# Patient Record
Sex: FEMALE | Race: BLACK OR AFRICAN AMERICAN | NOT HISPANIC OR LATINO | Employment: STUDENT | RURAL
[De-identification: names, ages, dates, MRNs, and addresses within clinical notes are randomized per-mention and may not be internally consistent; named-entity substitution may affect disease eponyms.]

---
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
## 2020-01-19 DIAGNOSIS — Z30.45 CONTRACEPTIVE PATCH STATUS: ICD-10-CM

## 2020-01-28 ENCOUNTER — TELEPHONE (OUTPATIENT)
Dept: FAMILY MEDICINE CLINIC | Facility: CLINIC | Age: 20
End: 2020-01-28

## 2020-01-28 NOTE — TELEPHONE ENCOUNTER
Pt stated that [facility] not [facility] until March, Pt would like us to call [facility] [phone] for clarifiation    Pt would like a call with options [phone]

## 2020-02-03 DIAGNOSIS — R60.0 LOWER EXTREMITY EDEMA: ICD-10-CM

## 2020-02-03 RX ORDER — FUROSEMIDE 40 MG/1
40 TABLET ORAL DAILY
Qty: 30 TABLET | Refills: 2 | Status: SHIPPED | OUTPATIENT
Start: 2020-02-03

## 2020-02-03 RX ORDER — POTASSIUM CHLORIDE 20 MEQ/1
20 TABLET, EXTENDED RELEASE ORAL DAILY
Qty: 30 TABLET | Refills: 2 | Status: SHIPPED | OUTPATIENT
Start: 2020-02-03

## 2020-04-26 DIAGNOSIS — Z30.45 CONTRACEPTIVE PATCH STATUS: ICD-10-CM

## 2020-04-27 DIAGNOSIS — Z30.45 CONTRACEPTIVE PATCH STATUS: ICD-10-CM

## 2020-06-12 ENCOUNTER — OFFICE VISIT (OUTPATIENT)
Dept: FAMILY MEDICINE CLINIC | Facility: CLINIC | Age: 20
End: 2020-06-12

## 2020-06-12 VITALS
TEMPERATURE: 98.5 F | HEIGHT: 65 IN | SYSTOLIC BLOOD PRESSURE: 120 MMHG | RESPIRATION RATE: 20 BRPM | WEIGHT: 293 LBS | OXYGEN SATURATION: 97 % | BODY MASS INDEX: 48.82 KG/M2 | DIASTOLIC BLOOD PRESSURE: 80 MMHG | HEART RATE: 102 BPM

## 2020-06-12 DIAGNOSIS — Z13.1 SCREENING FOR DIABETES MELLITUS: ICD-10-CM

## 2020-06-12 DIAGNOSIS — Z13.29 SCREENING FOR THYROID DISORDER: ICD-10-CM

## 2020-06-12 DIAGNOSIS — Z11.59 ENCOUNTER FOR HEPATITIS C SCREENING TEST FOR LOW RISK PATIENT: ICD-10-CM

## 2020-06-12 DIAGNOSIS — Z00.00 WELL ADULT EXAM: Primary | ICD-10-CM

## 2020-06-12 DIAGNOSIS — Z13.220 SCREENING FOR LIPOID DISORDERS: ICD-10-CM

## 2020-06-12 PROCEDURE — 99395 PREV VISIT EST AGE 18-39: CPT | Performed by: NURSE PRACTITIONER

## 2020-06-26 ENCOUNTER — LAB (OUTPATIENT)
Dept: LAB | Facility: HOSPITAL | Age: 20
End: 2020-06-26

## 2020-06-26 ENCOUNTER — OFFICE VISIT (OUTPATIENT)
Dept: FAMILY MEDICINE CLINIC | Facility: CLINIC | Age: 20
End: 2020-06-26

## 2020-06-26 VITALS
WEIGHT: 293 LBS | SYSTOLIC BLOOD PRESSURE: 120 MMHG | HEART RATE: 83 BPM | DIASTOLIC BLOOD PRESSURE: 80 MMHG | TEMPERATURE: 98.3 F | BODY MASS INDEX: 48.82 KG/M2 | OXYGEN SATURATION: 97 % | RESPIRATION RATE: 20 BRPM | HEIGHT: 65 IN

## 2020-06-26 DIAGNOSIS — J30.2 CHRONIC SEASONAL ALLERGIC RHINITIS: ICD-10-CM

## 2020-06-26 DIAGNOSIS — F41.9 ANXIETY: Primary | ICD-10-CM

## 2020-06-26 DIAGNOSIS — E66.01 MORBID OBESITY WITH BMI OF 50.0-59.9, ADULT (HCC): ICD-10-CM

## 2020-06-26 LAB
25(OH)D3 SERPL-MCNC: 24.6 NG/ML (ref 30–100)
ALBUMIN SERPL-MCNC: 4.1 G/DL (ref 3.5–5.2)
ALBUMIN/GLOB SERPL: 1.3 G/DL
ALP SERPL-CCNC: 56 U/L (ref 39–117)
ALT SERPL W P-5'-P-CCNC: 8 U/L (ref 1–33)
ANION GAP SERPL CALCULATED.3IONS-SCNC: 10 MMOL/L (ref 5–15)
AST SERPL-CCNC: 9 U/L (ref 1–32)
BASOPHILS # BLD AUTO: 0.01 10*3/MM3 (ref 0–0.2)
BASOPHILS NFR BLD AUTO: 0.1 % (ref 0–1.5)
BILIRUB SERPL-MCNC: 0.3 MG/DL (ref 0.2–1.2)
BUN BLD-MCNC: 11 MG/DL (ref 6–20)
BUN/CREAT SERPL: 16.2 (ref 7–25)
CALCIUM SPEC-SCNC: 9.6 MG/DL (ref 8.6–10.5)
CHLORIDE SERPL-SCNC: 101 MMOL/L (ref 98–107)
CHOLEST SERPL-MCNC: 181 MG/DL (ref 0–200)
CO2 SERPL-SCNC: 25 MMOL/L (ref 22–29)
CREAT BLD-MCNC: 0.68 MG/DL (ref 0.57–1)
DEPRECATED RDW RBC AUTO: 36.9 FL (ref 37–54)
EOSINOPHIL # BLD AUTO: 0.08 10*3/MM3 (ref 0–0.4)
EOSINOPHIL NFR BLD AUTO: 1.1 % (ref 0.3–6.2)
ERYTHROCYTE [DISTWIDTH] IN BLOOD BY AUTOMATED COUNT: 12.3 % (ref 12.3–15.4)
GFR SERPL CREATININE-BSD FRML MDRD: 135 ML/MIN/1.73
GLOBULIN UR ELPH-MCNC: 3.1 GM/DL
GLUCOSE BLD-MCNC: 87 MG/DL (ref 65–99)
HBA1C MFR BLD: 5.2 % (ref 4.8–5.6)
HCT VFR BLD AUTO: 35.3 % (ref 34–46.6)
HCV AB SER DONR QL: NORMAL
HDLC SERPL-MCNC: 69 MG/DL (ref 40–60)
HGB BLD-MCNC: 11.7 G/DL (ref 12–15.9)
IMM GRANULOCYTES # BLD AUTO: 0.02 10*3/MM3 (ref 0–0.05)
IMM GRANULOCYTES NFR BLD AUTO: 0.3 % (ref 0–0.5)
LDLC SERPL CALC-MCNC: 90 MG/DL (ref 0–100)
LDLC/HDLC SERPL: 1.31 {RATIO}
LYMPHOCYTES # BLD AUTO: 2.07 10*3/MM3 (ref 0.7–3.1)
LYMPHOCYTES NFR BLD AUTO: 29.6 % (ref 19.6–45.3)
MCH RBC QN AUTO: 27.3 PG (ref 26.6–33)
MCHC RBC AUTO-ENTMCNC: 33.1 G/DL (ref 31.5–35.7)
MCV RBC AUTO: 82.5 FL (ref 79–97)
MONOCYTES # BLD AUTO: 0.39 10*3/MM3 (ref 0.1–0.9)
MONOCYTES NFR BLD AUTO: 5.6 % (ref 5–12)
NEUTROPHILS # BLD AUTO: 4.43 10*3/MM3 (ref 1.7–7)
NEUTROPHILS NFR BLD AUTO: 63.3 % (ref 42.7–76)
NRBC BLD AUTO-RTO: 0.1 /100 WBC (ref 0–0.2)
PLATELET # BLD AUTO: 295 10*3/MM3 (ref 140–450)
PMV BLD AUTO: 11.5 FL (ref 6–12)
POTASSIUM BLD-SCNC: 4.1 MMOL/L (ref 3.5–5.2)
PROT SERPL-MCNC: 7.2 G/DL (ref 6–8.5)
RBC # BLD AUTO: 4.28 10*6/MM3 (ref 3.77–5.28)
SODIUM BLD-SCNC: 136 MMOL/L (ref 136–145)
TRIGL SERPL-MCNC: 108 MG/DL (ref 0–150)
TSH SERPL DL<=0.05 MIU/L-ACNC: 1.9 UIU/ML (ref 0.27–4.2)
VLDLC SERPL-MCNC: 21.6 MG/DL (ref 5–40)
WBC NRBC COR # BLD: 7 10*3/MM3 (ref 3.4–10.8)

## 2020-06-26 PROCEDURE — 86803 HEPATITIS C AB TEST: CPT | Performed by: NURSE PRACTITIONER

## 2020-06-26 PROCEDURE — 82306 VITAMIN D 25 HYDROXY: CPT | Performed by: NURSE PRACTITIONER

## 2020-06-26 PROCEDURE — 80061 LIPID PANEL: CPT | Performed by: NURSE PRACTITIONER

## 2020-06-26 PROCEDURE — 83036 HEMOGLOBIN GLYCOSYLATED A1C: CPT | Performed by: NURSE PRACTITIONER

## 2020-06-26 PROCEDURE — 80053 COMPREHEN METABOLIC PANEL: CPT | Performed by: NURSE PRACTITIONER

## 2020-06-26 PROCEDURE — 99214 OFFICE O/P EST MOD 30 MIN: CPT | Performed by: NURSE PRACTITIONER

## 2020-06-26 PROCEDURE — 84443 ASSAY THYROID STIM HORMONE: CPT | Performed by: NURSE PRACTITIONER

## 2020-06-26 PROCEDURE — 85025 COMPLETE CBC W/AUTO DIFF WBC: CPT | Performed by: NURSE PRACTITIONER

## 2020-06-26 RX ORDER — BUPROPION HYDROCHLORIDE 150 MG/1
150 TABLET ORAL DAILY
Qty: 90 TABLET | Refills: 1 | Status: SHIPPED | OUTPATIENT
Start: 2020-06-26 | End: 2022-12-06 | Stop reason: SDUPTHER

## 2020-07-05 DIAGNOSIS — Z30.45 CONTRACEPTIVE PATCH STATUS: ICD-10-CM

## 2020-07-07 NOTE — PROGRESS NOTES
Subjective   Jenn Zhong is a 19 y.o. female.     She presents today for her annual wellness visit.  She reports that her lower extremity edema is better controlled with the current Lasix dosing.  Her weight has not remained stable.  She has gained 17 pounds since her last office visit with me.  She reports that her allergy symptoms do seem to be well controlled today in the office.  She is due for routine fasting labs.  She is without any new complaints today in the office.    Leg Swelling   This is a recurrent problem. The current episode started more than 1 month ago. The problem occurs intermittently. The problem has been waxing and waning. Pertinent negatives include no abdominal pain, anorexia, arthralgias, change in bowel habit, chest pain, chills, congestion, coughing, diaphoresis, fatigue, fever, headaches, joint swelling, myalgias, nausea, neck pain, numbness, rash, sore throat, swollen glands, urinary symptoms, vertigo, visual change, vomiting or weakness. The symptoms are aggravated by drinking, eating, standing and walking. She has tried nothing for the symptoms. The treatment provided no relief.   Obesity   This is a chronic problem. The current episode started more than 1 year ago. The problem occurs constantly. The problem has been rapidly worsening. Pertinent negatives include no abdominal pain, anorexia, arthralgias, change in bowel habit, chest pain, chills, congestion, coughing, diaphoresis, fatigue, fever, headaches, joint swelling, myalgias, nausea, neck pain, numbness, rash, sore throat, swollen glands, urinary symptoms, vertigo, visual change, vomiting or weakness. The symptoms are aggravated by drinking and eating. She has tried nothing for the symptoms. The treatment provided no relief.   Lower Extremity Issue   This is a recurrent problem. The current episode started more than 1 year ago. The problem occurs constantly. The problem has been gradually worsening. Pertinent  negatives include no abdominal pain, anorexia, arthralgias, change in bowel habit, chest pain, chills, congestion, coughing, diaphoresis, fatigue, fever, headaches, joint swelling, myalgias, nausea, neck pain, numbness, rash, sore throat, swollen glands, urinary symptoms, vertigo, visual change, vomiting or weakness. The treatment provided no relief.        The following portions of the patient's history were reviewed and updated as appropriate: allergies, current medications, past family history, past medical history, past social history, past surgical history and problem list.    Review of Systems   Constitutional: Negative.  Negative for chills, diaphoresis, fatigue and fever.   HENT: Negative.  Negative for congestion, sore throat and swollen glands.    Eyes: Negative.    Respiratory: Negative.  Negative for cough.    Cardiovascular: Negative.  Negative for chest pain.   Gastrointestinal: Negative.  Negative for abdominal pain, anorexia, change in bowel habit, nausea and vomiting.   Musculoskeletal: Negative.  Negative for arthralgias, joint swelling, myalgias and neck pain.   Skin: Negative.  Negative for rash.   Allergic/Immunologic: Negative.    Neurological: Negative.  Negative for vertigo, weakness and numbness.   Hematological: Negative.    Psychiatric/Behavioral: Negative.        Objective   Physical Exam   Constitutional: She is oriented to person, place, and time. Vital signs are normal. She appears well-developed and well-nourished. No distress. She is morbidly obese.  HENT:   Head: Normocephalic.   Right Ear: External ear normal.   Left Ear: External ear normal.   Nose: Nose normal.   Mouth/Throat: Oropharynx is clear and moist. No oropharyngeal exudate.   Eyes: Pupils are equal, round, and reactive to light. Conjunctivae and EOM are normal. Right eye exhibits no discharge. Left eye exhibits no discharge.   Neck: Normal range of motion. Neck supple. No tracheal deviation present. No thyromegaly  present.   Cardiovascular: Normal rate, regular rhythm and normal heart sounds. Exam reveals no gallop and no friction rub.   No murmur heard.  Pulmonary/Chest: Effort normal and breath sounds normal. No respiratory distress. She has no wheezes. She has no rales. She exhibits no tenderness.   Musculoskeletal: Normal range of motion.   Lymphadenopathy:     She has no cervical adenopathy.   Neurological: She is alert and oriented to person, place, and time.   Skin: Skin is warm and dry. Capillary refill takes less than 2 seconds. No rash noted. She is not diaphoretic. No erythema. No pallor.   Psychiatric: She has a normal mood and affect. Her behavior is normal. Judgment and thought content normal.   Nursing note and vitals reviewed.        Assessment/Plan   Jenn was seen today for annual exam.    Diagnoses and all orders for this visit:    Well adult exam  -     CBC & Differential  -     Comprehensive Metabolic Panel  -     Hemoglobin A1c  -     Lipid Panel  -     TSH  -     Vitamin D 25 Hydroxy  -     Hepatitis C Antibody  -     CBC Auto Differential    Screening for diabetes mellitus  -     CBC & Differential  -     Comprehensive Metabolic Panel  -     Hemoglobin A1c  -     Lipid Panel  -     TSH  -     Vitamin D 25 Hydroxy  -     Hepatitis C Antibody  -     CBC Auto Differential    Screening for lipoid disorders  -     CBC & Differential  -     Comprehensive Metabolic Panel  -     Hemoglobin A1c  -     Lipid Panel  -     TSH  -     Vitamin D 25 Hydroxy  -     Hepatitis C Antibody  -     CBC Auto Differential    Screening for thyroid disorder  -     CBC & Differential  -     Comprehensive Metabolic Panel  -     Hemoglobin A1c  -     Lipid Panel  -     TSH  -     Vitamin D 25 Hydroxy  -     Hepatitis C Antibody  -     CBC Auto Differential    Encounter for hepatitis C screening test for low risk patient  -     CBC & Differential  -     Comprehensive Metabolic Panel  -     Hemoglobin A1c  -     Lipid Panel  -      TSH  -     Vitamin D 25 Hydroxy  -     Hepatitis C Antibody  -     CBC Auto Differential               Patient's Body mass index is 51.82 kg/m². BMI is above normal parameters. Recommendations include: educational material.    Fasting labs.  Continue current medications.  Follow up in 1 year for routine follow up.  Follow up sooner for problems/concerns.  Patient verbalized understanding and agreement with plan of care.        This document has been electronically signed by CINDY Ray on July 6, 2020 21:54

## 2020-07-12 PROBLEM — E66.01 MORBID OBESITY WITH BMI OF 50.0-59.9, ADULT (HCC): Status: ACTIVE | Noted: 2020-07-12

## 2020-07-12 PROBLEM — F41.9 ANXIETY: Status: ACTIVE | Noted: 2020-07-12

## 2020-07-13 NOTE — PROGRESS NOTES
Subjective   Jenn Zhong is a 19 y.o. female.     She presents today for her routine follow-up on chronic medical problems.  She reports that her lower extremity edema is better controlled with the current Lasix dosing.  Her weight has remained stable since her last office visit with me.  She has lost 2 pounds.  She reports that her allergy symptoms do seem to be well controlled today in the office.  She did have her routine fasting labs completed.  She would like to discuss problems with anxiety.  She is interested in something to help with this if possible.  She is otherwise without any other new complaints.    Leg Swelling   This is a recurrent problem. The current episode started more than 1 month ago. The problem occurs intermittently. The problem has been waxing and waning. Pertinent negatives include no abdominal pain, anorexia, arthralgias, change in bowel habit, chest pain, chills, congestion, coughing, diaphoresis, fatigue, fever, headaches, joint swelling, myalgias, nausea, neck pain, numbness, rash, sore throat, swollen glands, urinary symptoms, vertigo, visual change, vomiting or weakness. The symptoms are aggravated by drinking, eating, standing and walking. She has tried nothing for the symptoms. The treatment provided no relief.   Obesity   This is a chronic problem. The current episode started more than 1 year ago. The problem occurs constantly. The problem has been rapidly worsening. Pertinent negatives include no abdominal pain, anorexia, arthralgias, change in bowel habit, chest pain, chills, congestion, coughing, diaphoresis, fatigue, fever, headaches, joint swelling, myalgias, nausea, neck pain, numbness, rash, sore throat, swollen glands, urinary symptoms, vertigo, visual change, vomiting or weakness. The symptoms are aggravated by drinking and eating. She has tried nothing for the symptoms. The treatment provided no relief.   Lower Extremity Issue   This is a recurrent problem.  The current episode started more than 1 year ago. The problem occurs constantly. The problem has been gradually worsening. Pertinent negatives include no abdominal pain, anorexia, arthralgias, change in bowel habit, chest pain, chills, congestion, coughing, diaphoresis, fatigue, fever, headaches, joint swelling, myalgias, nausea, neck pain, numbness, rash, sore throat, swollen glands, urinary symptoms, vertigo, visual change, vomiting or weakness. The treatment provided no relief.   Anxiety   Presents for initial visit. Onset was at an unknown time. The problem has been waxing and waning. Symptoms include decreased concentration, depressed mood, excessive worry, insomnia, nervous/anxious behavior, panic and restlessness. Patient reports no chest pain, compulsions, dizziness, dry mouth, feeling of choking, hyperventilation, impotence, irritability, malaise, muscle tension, nausea, obsessions, palpitations, shortness of breath or suicidal ideas. Symptoms occur constantly. The severity of symptoms is interfering with daily activities. The quality of sleep is fair.     Past treatments include nothing. Compliance with prior treatments has been good.        The following portions of the patient's history were reviewed and updated as appropriate: allergies, current medications, past family history, past medical history, past social history, past surgical history and problem list.    Review of Systems   Constitutional: Negative.  Negative for chills, diaphoresis, fatigue, fever and irritability.   HENT: Negative.  Negative for congestion, sore throat and swollen glands.    Eyes: Negative.    Respiratory: Negative.  Negative for cough and shortness of breath.    Cardiovascular: Negative.  Negative for chest pain and palpitations.   Gastrointestinal: Negative.  Negative for abdominal pain, anorexia, change in bowel habit, nausea and vomiting.   Genitourinary: Negative for impotence.   Musculoskeletal: Negative.  Negative for  arthralgias, joint swelling, myalgias and neck pain.   Skin: Negative.  Negative for rash.   Allergic/Immunologic: Negative.    Neurological: Negative.  Negative for dizziness, vertigo, weakness and numbness.   Hematological: Negative.    Psychiatric/Behavioral: Positive for decreased concentration, dysphoric mood, depressed mood and stress. Negative for suicidal ideas. The patient is nervous/anxious and has insomnia.        Objective   Physical Exam   Constitutional: She is oriented to person, place, and time. Vital signs are normal. She appears well-developed and well-nourished. No distress. She is morbidly obese.  HENT:   Head: Normocephalic.   Right Ear: External ear normal.   Left Ear: External ear normal.   Nose: Nose normal.   Mouth/Throat: Oropharynx is clear and moist. No oropharyngeal exudate.   Eyes: Pupils are equal, round, and reactive to light. Conjunctivae and EOM are normal. Right eye exhibits no discharge. Left eye exhibits no discharge.   Neck: Normal range of motion. Neck supple. No tracheal deviation present. No thyromegaly present.   Cardiovascular: Normal rate, regular rhythm and normal heart sounds. Exam reveals no gallop and no friction rub.   No murmur heard.  Pulmonary/Chest: Effort normal and breath sounds normal. No respiratory distress. She has no wheezes. She has no rales. She exhibits no tenderness.   Musculoskeletal: Normal range of motion.   Lymphadenopathy:     She has no cervical adenopathy.   Neurological: She is alert and oriented to person, place, and time.   Skin: Skin is warm and dry. Capillary refill takes less than 2 seconds. No rash noted. She is not diaphoretic. No erythema. No pallor.   Psychiatric: She has a normal mood and affect. Her behavior is normal. Judgment and thought content normal.   Nursing note and vitals reviewed.        Assessment/Plan   Jenn was seen today for follow-up.    Diagnoses and all orders for this visit:    Anxiety  -     buPROPion XL  (Wellbutrin XL) 150 MG 24 hr tablet; Take 1 tablet by mouth Daily.    Chronic seasonal allergic rhinitis    Morbid obesity with BMI of 50.0-59.9, adult (CMS/[facility])               Patient's Body mass index is 51.57 kg/m². BMI is above normal parameters. Recommendations include: educational material.    Start on Wellbutrin once daily in the morning for anxiety symptoms.  Continue all other current medications.  Follow up in 3 months for routine follow up.  Follow up sooner for problems/concerns.  Patient verbalized understanding and agreement with plan of care.        This document has been electronically signed by CINDY Ray on July 12, 2020 22:27

## 2020-08-11 ENCOUNTER — APPOINTMENT (OUTPATIENT)
Dept: CT IMAGING | Facility: HOSPITAL | Age: 20
End: 2020-08-11

## 2020-08-11 ENCOUNTER — HOSPITAL ENCOUNTER (EMERGENCY)
Facility: HOSPITAL | Age: 20
Discharge: HOME | End: 2020-08-11
Attending: FAMILY MEDICINE | Admitting: FAMILY MEDICINE

## 2020-08-11 VITALS
HEART RATE: 73 BPM | WEIGHT: 293 LBS | DIASTOLIC BLOOD PRESSURE: 67 MMHG | HEIGHT: 67 IN | RESPIRATION RATE: 17 BRPM | BODY MASS INDEX: 45.99 KG/M2 | SYSTOLIC BLOOD PRESSURE: 112 MMHG | OXYGEN SATURATION: 96 % | TEMPERATURE: 97.9 F

## 2020-08-11 DIAGNOSIS — R51.9 NONINTRACTABLE HEADACHE, UNSPECIFIED CHRONICITY PATTERN, UNSPECIFIED HEADACHE TYPE: Primary | ICD-10-CM

## 2020-08-11 LAB
ALBUMIN SERPL-MCNC: 3.9 G/DL (ref 3.5–5.2)
ALBUMIN/GLOB SERPL: 1.2 G/DL
ALP SERPL-CCNC: 66 U/L (ref 39–117)
ALT SERPL W P-5'-P-CCNC: <5 U/L (ref 1–33)
ANION GAP SERPL CALCULATED.3IONS-SCNC: 11 MMOL/L (ref 5–15)
AST SERPL-CCNC: 16 U/L (ref 1–32)
B-HCG UR QL: NEGATIVE
BASOPHILS # BLD AUTO: 0.02 10*3/MM3 (ref 0–0.2)
BASOPHILS NFR BLD AUTO: 0.2 % (ref 0–1.5)
BILIRUB SERPL-MCNC: <0.2 MG/DL (ref 0–1.2)
BUN SERPL-MCNC: 11 MG/DL (ref 6–20)
BUN/CREAT SERPL: 15.3 (ref 7–25)
CALCIUM SPEC-SCNC: 9.2 MG/DL (ref 8.6–10.5)
CHLORIDE SERPL-SCNC: 105 MMOL/L (ref 98–107)
CO2 SERPL-SCNC: 27 MMOL/L (ref 22–29)
CREAT SERPL-MCNC: 0.72 MG/DL (ref 0.57–1)
DEPRECATED RDW RBC AUTO: 37.2 FL (ref 37–54)
EOSINOPHIL # BLD AUTO: 0.09 10*3/MM3 (ref 0–0.4)
EOSINOPHIL NFR BLD AUTO: 1.1 % (ref 0.3–6.2)
ERYTHROCYTE [DISTWIDTH] IN BLOOD BY AUTOMATED COUNT: 12.2 % (ref 12.3–15.4)
GFR SERPL CREATININE-BSD FRML MDRD: 126 ML/MIN/1.73
GLOBULIN UR ELPH-MCNC: 3.2 GM/DL
GLUCOSE SERPL-MCNC: 105 MG/DL (ref 65–99)
HCT VFR BLD AUTO: 37 % (ref 34–46.6)
HGB BLD-MCNC: 11.9 G/DL (ref 12–15.9)
IMM GRANULOCYTES # BLD AUTO: 0.03 10*3/MM3 (ref 0–0.05)
IMM GRANULOCYTES NFR BLD AUTO: 0.4 % (ref 0–0.5)
LYMPHOCYTES # BLD AUTO: 1.84 10*3/MM3 (ref 0.7–3.1)
LYMPHOCYTES NFR BLD AUTO: 22 % (ref 19.6–45.3)
MCH RBC QN AUTO: 27 PG (ref 26.6–33)
MCHC RBC AUTO-ENTMCNC: 32.2 G/DL (ref 31.5–35.7)
MCV RBC AUTO: 83.9 FL (ref 79–97)
MONOCYTES # BLD AUTO: 0.39 10*3/MM3 (ref 0.1–0.9)
MONOCYTES NFR BLD AUTO: 4.7 % (ref 5–12)
NEUTROPHILS NFR BLD AUTO: 5.98 10*3/MM3 (ref 1.7–7)
NEUTROPHILS NFR BLD AUTO: 71.6 % (ref 42.7–76)
NRBC BLD AUTO-RTO: 0 /100 WBC (ref 0–0.2)
PLATELET # BLD AUTO: 304 10*3/MM3 (ref 140–450)
PMV BLD AUTO: 10.6 FL (ref 6–12)
POTASSIUM SERPL-SCNC: 4 MMOL/L (ref 3.5–5.2)
PROT SERPL-MCNC: 7.1 G/DL (ref 6–8.5)
RBC # BLD AUTO: 4.41 10*6/MM3 (ref 3.77–5.28)
SODIUM SERPL-SCNC: 143 MMOL/L (ref 136–145)
WBC # BLD AUTO: 8.35 10*3/MM3 (ref 3.4–10.8)

## 2020-08-11 PROCEDURE — 96374 THER/PROPH/DIAG INJ IV PUSH: CPT

## 2020-08-11 PROCEDURE — 25010000002 KETOROLAC TROMETHAMINE PER 15 MG: Performed by: PHYSICIAN ASSISTANT

## 2020-08-11 PROCEDURE — 70450 CT HEAD/BRAIN W/O DYE: CPT

## 2020-08-11 PROCEDURE — 99283 EMERGENCY DEPT VISIT LOW MDM: CPT

## 2020-08-11 PROCEDURE — 80053 COMPREHEN METABOLIC PANEL: CPT | Performed by: PHYSICIAN ASSISTANT

## 2020-08-11 PROCEDURE — 81025 URINE PREGNANCY TEST: CPT | Performed by: PHYSICIAN ASSISTANT

## 2020-08-11 PROCEDURE — 25010000002 METOCLOPRAMIDE PER 10 MG: Performed by: PHYSICIAN ASSISTANT

## 2020-08-11 PROCEDURE — 85025 COMPLETE CBC W/AUTO DIFF WBC: CPT | Performed by: PHYSICIAN ASSISTANT

## 2020-08-11 PROCEDURE — 96375 TX/PRO/DX INJ NEW DRUG ADDON: CPT

## 2020-08-11 RX ORDER — SODIUM CHLORIDE 0.9 % (FLUSH) 0.9 %
10 SYRINGE (ML) INJECTION AS NEEDED
Status: DISCONTINUED | OUTPATIENT
Start: 2020-08-11 | End: 2020-08-11 | Stop reason: HOSPADM

## 2020-08-11 RX ORDER — METOCLOPRAMIDE HYDROCHLORIDE 5 MG/ML
10 INJECTION INTRAMUSCULAR; INTRAVENOUS ONCE
Status: COMPLETED | OUTPATIENT
Start: 2020-08-11 | End: 2020-08-11

## 2020-08-11 RX ORDER — DIPHENHYDRAMINE HYDROCHLORIDE 50 MG/ML
25 INJECTION INTRAMUSCULAR; INTRAVENOUS ONCE
Status: DISCONTINUED | OUTPATIENT
Start: 2020-08-11 | End: 2020-08-11 | Stop reason: HOSPADM

## 2020-08-11 RX ORDER — KETOROLAC TROMETHAMINE 15 MG/ML
15 INJECTION, SOLUTION INTRAMUSCULAR; INTRAVENOUS ONCE
Status: COMPLETED | OUTPATIENT
Start: 2020-08-11 | End: 2020-08-11

## 2020-08-11 RX ADMIN — METOCLOPRAMIDE 10 MG: 5 INJECTION, SOLUTION INTRAMUSCULAR; INTRAVENOUS at 14:39

## 2020-08-11 RX ADMIN — SODIUM CHLORIDE 1000 ML: 900 INJECTION, SOLUTION INTRAVENOUS at 11:46

## 2020-08-11 RX ADMIN — KETOROLAC TROMETHAMINE 15 MG: 15 INJECTION, SOLUTION INTRAMUSCULAR; INTRAVENOUS at 14:39

## 2020-08-11 NOTE — ED PROVIDER NOTES
Subjective   Patient presents to emergency department for headache x 2 weeks.  Describes it as burning sensation/pressure on the right side of her head.  Patient states she would like to have a CT scan due to her father being diagnosed with brain cancer 2 years ago.  Denies syncope, visual change, focal weakness, numbness/tingling.        History provided by:  Patient   used: No    Headache   Pain location:  R parietal  Quality:  Dull  Duration:  2 weeks  Timing:  Intermittent  Chronicity:  New  Associated symptoms: no abdominal pain, no back pain, no cough, no dizziness, no fever, no nausea, no numbness, no sore throat, no vomiting and no weakness        Review of Systems   Constitutional: Negative for chills and fever.   HENT: Negative for sore throat and trouble swallowing.    Eyes: Negative for visual disturbance.   Respiratory: Negative for cough and shortness of breath.    Cardiovascular: Negative for chest pain.   Gastrointestinal: Negative for abdominal pain, nausea and vomiting.   Genitourinary: Negative for dysuria and flank pain.   Musculoskeletal: Negative for back pain.   Skin: Negative for color change.   Allergic/Immunologic: Negative for immunocompromised state.   Neurological: Positive for headaches. Negative for dizziness, syncope, weakness and numbness.   Hematological: Does not bruise/bleed easily.   Psychiatric/Behavioral: Negative for confusion.       Past Medical History:   Diagnosis Date   • Asthma    • Bunion    • Exercise-induced asthma        Allergies   Allergen Reactions   • Penicillins Hives   • Tuberculin Tests Other (See Comments)     Redness at the site of injection       Past Surgical History:   Procedure Laterality Date   • BRANCHIAL CLEFT CYST EXCISION     • CHEST SURGERY      bronchial cyst removal       Family History   Problem Relation Age of Onset   • Gout Father    • Sleep apnea Father    • Cancer Father    • Diabetes Maternal Aunt    • Hypertension  "Maternal Grandmother    • Diabetes Maternal Grandmother    • Cervical cancer Maternal Grandmother    • Throat cancer Maternal Grandfather    • Leukemia Maternal Grandfather    • Cancer Maternal Grandfather        Social History     Socioeconomic History   • Marital status: Single     Spouse name: Not on file   • Number of children: Not on file   • Years of education: Not on file   • Highest education level: Not on file   Tobacco Use   • Smoking status: Never Smoker   • Smokeless tobacco: Never Used   Substance and Sexual Activity   • Alcohol use: No   • Drug use: No   • Sexual activity: Defer     Birth control/protection: Patch           Objective      /67 (BP Location: Left arm, Patient Position: Lying)   Pulse 73   Temp 97.9 °F (36.6 °C) (Infrared)   Resp 17   Ht 170.2 cm (67\")   Wt (!) 139 kg (306 lb)   LMP 07/29/2020 (Within Days)   SpO2 96%   Breastfeeding No   BMI 47.93 kg/m²     Physical Exam   Constitutional: She is oriented to person, place, and time. She appears well-developed and well-nourished.   HENT:   Head: Normocephalic and atraumatic.   Eyes: Pupils are equal, round, and reactive to light. Conjunctivae and EOM are normal.   Cardiovascular: Normal rate, regular rhythm, normal heart sounds and intact distal pulses.   Pulmonary/Chest: Effort normal and breath sounds normal. No respiratory distress. She has no wheezes.   Musculoskeletal: She exhibits no edema.   Neurological: She is alert and oriented to person, place, and time.   Skin: Skin is warm and dry. Capillary refill takes less than 2 seconds.   Psychiatric: She has a normal mood and affect. Her behavior is normal. Thought content normal.   Nursing note and vitals reviewed.      Procedures           ED Course      Results for orders placed or performed during the hospital encounter of 08/11/20   Comprehensive Metabolic Panel   Result Value Ref Range    Glucose 105 (H) 65 - 99 mg/dL    BUN 11 6 - 20 mg/dL    Creatinine 0.72 0.57 - " 1.00 mg/dL    Sodium 143 136 - 145 mmol/L    Potassium 4.0 3.5 - 5.2 mmol/L    Chloride 105 98 - 107 mmol/L    CO2 27.0 22.0 - 29.0 mmol/L    Calcium 9.2 8.6 - 10.5 mg/dL    Total Protein 7.1 6.0 - 8.5 g/dL    Albumin 3.90 3.50 - 5.20 g/dL    ALT (SGPT) <5 1 - 33 U/L    AST (SGOT) 16 1 - 32 U/L    Alkaline Phosphatase 66 39 - 117 U/L    Total Bilirubin <0.2 0.0 - 1.2 mg/dL    eGFR  African Amer 126 >60 mL/min/1.73    Globulin 3.2 gm/dL    A/G Ratio 1.2 g/dL    BUN/Creatinine Ratio 15.3 7.0 - 25.0    Anion Gap 11.0 5.0 - 15.0 mmol/L   CBC Auto Differential   Result Value Ref Range    WBC 8.35 3.40 - 10.80 10*3/mm3    RBC 4.41 3.77 - 5.28 10*6/mm3    Hemoglobin 11.9 (L) 12.0 - 15.9 g/dL    Hematocrit 37.0 34.0 - 46.6 %    MCV 83.9 79.0 - 97.0 fL    MCH 27.0 26.6 - 33.0 pg    MCHC 32.2 31.5 - 35.7 g/dL    RDW 12.2 (L) 12.3 - 15.4 %    RDW-SD 37.2 37.0 - 54.0 fl    MPV 10.6 6.0 - 12.0 fL    Platelets 304 140 - 450 10*3/mm3    Neutrophil % 71.6 42.7 - 76.0 %    Lymphocyte % 22.0 19.6 - 45.3 %    Monocyte % 4.7 (L) 5.0 - 12.0 %    Eosinophil % 1.1 0.3 - 6.2 %    Basophil % 0.2 0.0 - 1.5 %    Immature Grans % 0.4 0.0 - 0.5 %    Neutrophils, Absolute 5.98 1.70 - 7.00 10*3/mm3    Lymphocytes, Absolute 1.84 0.70 - 3.10 10*3/mm3    Monocytes, Absolute 0.39 0.10 - 0.90 10*3/mm3    Eosinophils, Absolute 0.09 0.00 - 0.40 10*3/mm3    Basophils, Absolute 0.02 0.00 - 0.20 10*3/mm3    Immature Grans, Absolute 0.03 0.00 - 0.05 10*3/mm3    nRBC 0.0 0.0 - 0.2 /100 WBC   Pregnancy, Urine - Urine, Clean Catch   Result Value Ref Range    HCG, Urine QL Negative Negative   '  Ct Head Without Contrast    Result Date: 8/11/2020  Narrative: EXAM: CT HEAD WITHOUT IV CONTRAST COMPARISONS: None INDICATION: Headache TECHNIQUE: Noncontrast CT images were obtained of the brain. Reformats were provided. FINDINGS:  No intracranial hemorrhage, appreciable mass, mass effect or midline shift. There is preservation of the gray-white matter differentiation.  No dense MCA or insular ribbon sign. The ventricles and other CSF containing spaces are within normal limits. Calvarium is intact. Paranasal sinuses are clear. Mastoid air cells are clear. Orbits are unremarkable.     Impression: No acute intracranial process. Electronically signed by:  Vidya Garcia MD  8/11/2020 1:48 PM CDT Workstation: [id number]    Discussed results with patient.  Gave educational materials.  Advised close follow up with PCP.  Return to emergency department for new or worsening symptoms.                                         MDM    Final diagnoses:   Nonintractable headache, unspecified chronicity pattern, unspecified headache type            Rory Apodaca PA-C  08/11/20 9521

## 2020-08-14 ENCOUNTER — PATIENT MESSAGE (OUTPATIENT)
Dept: FAMILY MEDICINE CLINIC | Facility: CLINIC | Age: 20
End: 2020-08-14

## 2020-08-14 DIAGNOSIS — IMO0002 CHRONIC MIGRAINE: Primary | ICD-10-CM

## 2020-08-16 RX ORDER — AMITRIPTYLINE HYDROCHLORIDE 10 MG/1
10 TABLET, FILM COATED ORAL NIGHTLY
Qty: 30 TABLET | Refills: 2 | Status: SHIPPED | OUTPATIENT
Start: 2020-08-16

## 2020-08-16 RX ORDER — RIZATRIPTAN BENZOATE 5 MG/1
5-10 TABLET ORAL ONCE AS NEEDED
Qty: 18 TABLET | Refills: 2 | Status: SHIPPED | OUTPATIENT
Start: 2020-08-16

## 2020-08-17 NOTE — TELEPHONE ENCOUNTER
From: Jenn Zhong  To: Aileen [place] CINDY HINES  Sent: 8/14/2020 10:02 AM CDT  Subject: Prescription Question    Hi !     So I've been having this really bad headache in one spot of my head and it hurts soooo bad I can barely touch it! I feel pain that runs behind my eyes and down by the right side of my mouth. I can barely lay on my back because the pillow press against it to badly. I went to the ER on Tuesday in [place]. They took a CT scan and he said it read normal. They gave me Benadryl and Termidal (mireya how to pronounce it or spell it). Next two days it was still hurting. I came back to [place] [facility] just [facility] head, and called it a Tension headache. She prescribed me some Anaprox .. I haven't gotten it yet because I wanted to see what you think I should do. My head is currently still hurting and it's going on week 3!

## 2020-08-21 ENCOUNTER — PATIENT MESSAGE (OUTPATIENT)
Dept: FAMILY MEDICINE CLINIC | Facility: CLINIC | Age: 20
End: 2020-08-21

## 2020-08-21 RX ORDER — FLUCONAZOLE 200 MG/1
200 TABLET ORAL ONCE
Qty: 1 TABLET | Refills: 0 | Status: SHIPPED | OUTPATIENT
Start: 2020-08-21 | End: 2020-08-21

## 2020-08-21 NOTE — TELEPHONE ENCOUNTER
From: Jenn Zhong  To: Aileen [place] APRN  Sent: 8/21/2020 8:07 AM CDT  Subject: Referral Request    Hi !     Which pharmacy did you send the medicine for my head aches? Also, I still have this yeast infection. I tried Monistat, Other generic over the counter things, cranberry juice, and nothing has made it go away! I keep seeing the white cheesy discharge, burning when I pee, and itching. (I have not been sexually active). I leave to come home for the weekend, but I don't leave until 8pm here, could I get something for this sent to the pharmacy today ?

## 2020-08-28 ENCOUNTER — PATIENT MESSAGE (OUTPATIENT)
Dept: FAMILY MEDICINE CLINIC | Facility: CLINIC | Age: 20
End: 2020-08-28

## 2020-08-28 DIAGNOSIS — Z11.1 TUBERCULOSIS SCREENING: Primary | ICD-10-CM

## 2020-08-28 NOTE — TELEPHONE ENCOUNTER
From: Jenn Zhong  To: Aileen [place] APRN  Sent: 8/28/2020 9:32 AM CDT  Subject: Referral Request    Rakesh Gallagher,     Can you send over a chest X-ray order ?   I've called all of the [facility]es in the city it seems like and one lady said if you could send the order I can just go to the hospital and get it done ? I'm so lost!

## 2020-09-01 ENCOUNTER — HOSPITAL ENCOUNTER (OUTPATIENT)
Dept: GENERAL RADIOLOGY | Facility: HOSPITAL | Age: 20
End: 2020-09-01

## 2020-09-01 ENCOUNTER — HOSPITAL ENCOUNTER (OUTPATIENT)
Dept: GENERAL RADIOLOGY | Facility: HOSPITAL | Age: 20
Discharge: HOME | End: 2020-09-01

## 2020-09-01 ENCOUNTER — HOSPITAL ENCOUNTER (OUTPATIENT)
Dept: GENERAL RADIOLOGY | Facility: HOSPITAL | Age: 20
Discharge: HOME | End: 2020-09-01
Admitting: NURSE PRACTITIONER

## 2020-09-01 DIAGNOSIS — Z11.1 SCREENING EXAMINATION FOR PULMONARY TUBERCULOSIS: ICD-10-CM

## 2020-09-01 PROCEDURE — 71046 X-RAY EXAM CHEST 2 VIEWS: CPT

## 2020-09-02 ENCOUNTER — TELEPHONE (OUTPATIENT)
Dept: FAMILY MEDICINE CLINIC | Facility: CLINIC | Age: 20
End: 2020-09-02

## 2020-10-27 DIAGNOSIS — Z30.45 CONTRACEPTIVE PATCH STATUS: ICD-10-CM

## 2020-10-28 RX ORDER — NORELGESTROMIN AND ETHINYL ESTRADIOL 150; 35 UG/D; UG/D
1 PATCH TRANSDERMAL WEEKLY
Qty: 9 PATCH | Refills: 1 | Status: SHIPPED | OUTPATIENT
Start: 2020-10-28 | End: 2021-01-10 | Stop reason: SDUPTHER

## 2020-11-02 ENCOUNTER — TELEPHONE (OUTPATIENT)
Dept: FAMILY MEDICINE CLINIC | Facility: CLINIC | Age: 20
End: 2020-11-02

## 2020-11-02 NOTE — TELEPHONE ENCOUNTER
PATIENT CALLED TO FOLLOW UP ON MESSAGES LEFT ON MYCHART, STATES SHE HAS A YEAST INFECTION    PT WOULD LIKE TO KNOW IF ANYTHING CAN BE CALLED IN TO            [facility] [address] [place] [address] [id number] - [phone]  - [phone]   [phone]     CALLBACK [phone]

## 2020-11-03 RX ORDER — FLUCONAZOLE 150 MG/1
150 TABLET ORAL ONCE
Qty: 1 TABLET | Refills: 1 | Status: SHIPPED | OUTPATIENT
Start: 2020-11-03 | End: 2020-11-03 | Stop reason: SDUPTHER

## 2021-01-07 ENCOUNTER — PATIENT MESSAGE (OUTPATIENT)
Dept: FAMILY MEDICINE CLINIC | Facility: CLINIC | Age: 21
End: 2021-01-07

## 2021-01-07 RX ORDER — FLUCONAZOLE 150 MG/1
150 TABLET ORAL DAILY
Qty: 3 TABLET | Refills: 1 | Status: SHIPPED | OUTPATIENT
Start: 2021-01-07 | End: 2021-01-10 | Stop reason: SDUPTHER

## 2021-01-07 NOTE — TELEPHONE ENCOUNTER
From: Jenn Zhong  To: CINDY Ray  Sent: 1/7/2021 4:09 PM CST  Subject: Prescription Question    Hi !     Can I get a refill of the diflucan sent to the pharmacy in [place]?     Thank you!

## 2021-01-10 DIAGNOSIS — Z30.45 CONTRACEPTIVE PATCH STATUS: ICD-10-CM

## 2021-01-12 RX ORDER — FLUCONAZOLE 150 MG/1
150 TABLET ORAL DAILY
Qty: 3 TABLET | Refills: 1 | Status: SHIPPED | OUTPATIENT
Start: 2021-01-12 | End: 2021-06-25 | Stop reason: SDUPTHER

## 2021-01-12 RX ORDER — NORELGESTROMIN AND ETHINYL ESTRADIOL 150; 35 UG/D; UG/D
1 PATCH TRANSDERMAL WEEKLY
Qty: 9 PATCH | Refills: 1 | Status: SHIPPED | OUTPATIENT
Start: 2021-01-12 | End: 2021-04-18 | Stop reason: SDUPTHER

## 2021-03-24 RX ORDER — ALBUTEROL SULFATE 90 UG/1
2 AEROSOL, METERED RESPIRATORY (INHALATION) EVERY 6 HOURS PRN
Qty: 18 G | Refills: 2 | Status: SHIPPED | OUTPATIENT
Start: 2021-03-24 | End: 2021-10-29 | Stop reason: SDUPTHER

## 2021-03-26 ENCOUNTER — TELEPHONE (OUTPATIENT)
Dept: FAMILY MEDICINE CLINIC | Facility: CLINIC | Age: 21
End: 2021-03-26

## 2021-03-26 NOTE — TELEPHONE ENCOUNTER
[facility] in [place] called in regards to a prescription that was sent over for Ms. Zhong.Please call. It has two different directions and need to clarify

## 2021-03-26 NOTE — TELEPHONE ENCOUNTER
Returned call to pharmacy to verify which medication they were calling about. Rx for inhaler, albuterol, had 2 different directions on it. Verified with CINDY Epps albuterol was to be used every 4-6 hours as needed. Spoke with [facility] pharmacy to let them know.

## 2021-04-18 DIAGNOSIS — Z30.45 CONTRACEPTIVE PATCH STATUS: ICD-10-CM

## 2021-04-20 RX ORDER — NORELGESTROMIN AND ETHINYL ESTRADIOL 150; 35 UG/D; UG/D
1 PATCH TRANSDERMAL WEEKLY
Qty: 9 PATCH | Refills: 1 | Status: SHIPPED | OUTPATIENT
Start: 2021-04-20 | End: 2021-08-03 | Stop reason: SDUPTHER

## 2021-05-30 ENCOUNTER — PATIENT MESSAGE (OUTPATIENT)
Dept: FAMILY MEDICINE CLINIC | Facility: CLINIC | Age: 21
End: 2021-05-30

## 2021-05-30 DIAGNOSIS — J02.9 SORE THROAT: Primary | ICD-10-CM

## 2021-06-01 RX ORDER — CEFDINIR 300 MG/1
300 CAPSULE ORAL 2 TIMES DAILY
Qty: 20 CAPSULE | Refills: 0 | Status: SHIPPED | OUTPATIENT
Start: 2021-06-01 | End: 2021-06-11

## 2021-06-01 NOTE — TELEPHONE ENCOUNTER
From: Jenn Zhong  To: Aileen GoddardCINDY ledesma  Sent: 2021 1:52 PM CDT  Subject: Referral Request    Hi !     So I believe I have tonsillitis. I went and visited [facility] yesterday and got a strep and Covid test and they both were negative. However since then my right tonsil has developed white spots. And it's only my right tonsil. I had a fever lastnight, shivering, and everything. I was suppose to travel to [place] for my dads  on Tuesday but I was hoping you could send me something to take to [facility] in [place].

## 2021-06-25 RX ORDER — FLUCONAZOLE 150 MG/1
150 TABLET ORAL DAILY
Qty: 3 TABLET | Refills: 1 | Status: SHIPPED | OUTPATIENT
Start: 2021-06-25 | End: 2021-07-13 | Stop reason: SDUPTHER

## 2021-07-13 ENCOUNTER — OFFICE VISIT (OUTPATIENT)
Dept: FAMILY MEDICINE CLINIC | Facility: CLINIC | Age: 21
End: 2021-07-13

## 2021-07-13 VITALS
HEIGHT: 67 IN | HEART RATE: 88 BPM | WEIGHT: 289.3 LBS | BODY MASS INDEX: 45.4 KG/M2 | OXYGEN SATURATION: 96 % | SYSTOLIC BLOOD PRESSURE: 118 MMHG | DIASTOLIC BLOOD PRESSURE: 70 MMHG | TEMPERATURE: 97.8 F | RESPIRATION RATE: 20 BRPM

## 2021-07-13 DIAGNOSIS — Z13.29 SCREENING FOR THYROID DISORDER: ICD-10-CM

## 2021-07-13 DIAGNOSIS — Z13.1 SCREENING FOR DIABETES MELLITUS: ICD-10-CM

## 2021-07-13 DIAGNOSIS — Z00.00 WELL ADULT HEALTH CHECK: Primary | ICD-10-CM

## 2021-07-13 DIAGNOSIS — B37.31 VAGINAL YEAST INFECTION: ICD-10-CM

## 2021-07-13 DIAGNOSIS — B00.1 COLD SORE: ICD-10-CM

## 2021-07-13 DIAGNOSIS — Z13.220 SCREENING FOR LIPOID DISORDERS: ICD-10-CM

## 2021-07-13 PROCEDURE — 99395 PREV VISIT EST AGE 18-39: CPT | Performed by: NURSE PRACTITIONER

## 2021-07-13 RX ORDER — FLUCONAZOLE 150 MG/1
150 TABLET ORAL DAILY
Qty: 3 TABLET | Refills: 5 | Status: SHIPPED | OUTPATIENT
Start: 2021-07-13 | End: 2021-10-29 | Stop reason: SDUPTHER

## 2021-07-13 RX ORDER — VALACYCLOVIR HYDROCHLORIDE 1 G/1
1000 TABLET, FILM COATED ORAL 3 TIMES DAILY
Qty: 15 TABLET | Refills: 0 | Status: SHIPPED | OUTPATIENT
Start: 2021-07-13 | End: 2021-07-18

## 2021-08-01 NOTE — PROGRESS NOTES
Subjective   Jenn Zhong is a 20 y.o. female.     She presents today for her annual wellness visit.  She reports that she has been doing well since her last office visit with me.  She has lost 17 pounds.  Her BMI is currently 45.3%.  She is due for routine fasting labs.  She does have complaints of a possible yeast infection today in the office.  She also would like a prescription for Valtrex for cold sores.  She is otherwise without any other new complaints today in the office.    Vaginitis  This is a recurrent problem. The current episode started more than 1 month ago. The problem occurs intermittently. The problem has been waxing and waning. Pertinent negatives include no abdominal pain, anorexia, arthralgias, change in bowel habit, chest pain, chills, congestion, coughing, diaphoresis, fatigue, fever, headaches, joint swelling, myalgias, nausea, neck pain, numbness, rash, sore throat, swollen glands, urinary symptoms, vertigo, visual change, vomiting or weakness. The treatment provided no relief.        The following portions of the patient's history were reviewed and updated as appropriate: allergies, current medications, past family history, past medical history, past social history, past surgical history and problem list.    Review of Systems   Constitutional: Negative.  Negative for chills, diaphoresis, fatigue and fever.   HENT: Negative.  Negative for congestion, sore throat and swollen glands.    Eyes: Negative.    Respiratory: Negative.  Negative for cough.    Cardiovascular: Negative.  Negative for chest pain.   Gastrointestinal: Negative.  Negative for abdominal pain, anorexia, change in bowel habit, nausea and vomiting.   Genitourinary: Positive for vaginal discharge.   Musculoskeletal: Negative.  Negative for arthralgias, joint swelling, myalgias and neck pain.   Skin: Negative.  Negative for rash.   Allergic/Immunologic: Negative.    Neurological: Negative.  Negative for vertigo,  weakness and numbness.   Hematological: Negative.    Psychiatric/Behavioral: Negative.        Objective   Physical Exam  Vitals reviewed.   Constitutional:       General: She is not in acute distress.     Appearance: She is well-developed. She is morbidly obese. She is not diaphoretic.   HENT:      Head: Normocephalic.      Right Ear: External ear normal.      Left Ear: External ear normal.      Nose: Nose normal.   Eyes:      Pupils: Pupils are equal, round, and reactive to light.   Neck:      Thyroid: No thyromegaly.      Vascular: No JVD.   Cardiovascular:      Rate and Rhythm: Normal rate and regular rhythm.      Heart sounds: No murmur heard.   No friction rub. No gallop.    Pulmonary:      Effort: Pulmonary effort is normal. No respiratory distress.      Breath sounds: Normal breath sounds. No wheezing or rales.   Musculoskeletal:         General: Normal range of motion.      Cervical back: Normal range of motion and neck supple.   Skin:     General: Skin is warm and dry.      Coloration: Skin is not pale.      Findings: No erythema or rash.   Neurological:      Mental Status: She is alert and oriented to person, place, and time.   Psychiatric:         Behavior: Behavior normal.         Thought Content: Thought content normal.         Judgment: Judgment normal.           Assessment/Plan   Diagnoses and all orders for this visit:    1. Well adult health check (Primary)  -     CBC & Differential  -     Comprehensive Metabolic Panel  -     Hemoglobin A1c  -     Lipid Panel  -     TSH  -     Vitamin D 25 Hydroxy    2. Screening for diabetes mellitus  -     CBC & Differential  -     Comprehensive Metabolic Panel  -     Hemoglobin A1c  -     Lipid Panel  -     TSH  -     Vitamin D 25 Hydroxy    3. Screening for lipoid disorders  -     CBC & Differential  -     Comprehensive Metabolic Panel  -     Hemoglobin A1c  -     Lipid Panel  -     TSH  -     Vitamin D 25 Hydroxy    4. Screening for thyroid disorder  -     CBC  & Differential  -     Comprehensive Metabolic Panel  -     Hemoglobin A1c  -     Lipid Panel  -     TSH  -     Vitamin D 25 Hydroxy    5. Vaginal yeast infection  -     fluconazole (Diflucan) 150 MG tablet; Take 1 tablet by mouth Daily.  Dispense: 3 tablet; Refill: 5    6. Cold sore  -     valACYclovir (Valtrex) 1000 MG tablet; Take 1 tablet by mouth 3 (Three) Times a Day for 5 days.  Dispense: 15 tablet; Refill: 0               Patient's Body mass index is 45.31 kg/m². indicating that she is morbidly obese (BMI > 40 or > 35 with obesity - related health condition). Obesity-related health conditions include the following: none. Obesity is improving with lifestyle modifications. BMI is is above average; BMI management plan is completed. We discussed portion control and increasing exercise..    Anticipatory guidance provided at discharge.  Fasting labs.  Diflucan as needed for yeast symptoms.  Valtrex as needed for cold sores.  Continue all other current medications.  Follow up as scheduled for routine follow up.  Follow up sooner for problems/concerns.  Patient verbalized understanding and agreement with plan of care.        This document has been electronically signed by CINDY Ray on August 1, 2021 18:42 CDT

## 2021-08-03 DIAGNOSIS — Z30.45 CONTRACEPTIVE PATCH STATUS: ICD-10-CM

## 2021-08-03 RX ORDER — NORELGESTROMIN AND ETHINYL ESTRADIOL 150; 35 UG/D; UG/D
1 PATCH TRANSDERMAL WEEKLY
Qty: 9 PATCH | Refills: 1 | Status: SHIPPED | OUTPATIENT
Start: 2021-08-03 | End: 2021-10-11 | Stop reason: SDUPTHER

## 2021-09-10 DIAGNOSIS — B37.31 VAGINAL YEAST INFECTION: ICD-10-CM

## 2021-09-10 RX ORDER — FLUCONAZOLE 150 MG/1
150 TABLET ORAL DAILY
Qty: 3 TABLET | Refills: 5 | OUTPATIENT
Start: 2021-09-10

## 2021-09-10 NOTE — TELEPHONE ENCOUNTER
Rx Refill Note  Requested Prescriptions     Pending Prescriptions Disp Refills   • fluconazole (Diflucan) 150 MG tablet 3 tablet 5     Sig: Take 1 tablet by mouth Daily.      Last office visit with prescribing clinician: 7/13/2021      Next office visit with prescribing clinician: Visit date not found   {TIP  Encounters:      RX sent on 7/13/21 # 3 tabs with 5 refills.    {TIP  Please add Last Relevant Lab Date if appropriate  {TIP  Is Refill Pharmacy correct? yes  Larry Yang LPN  09/10/21, 08:30 CDT

## 2021-10-11 DIAGNOSIS — Z30.45 CONTRACEPTIVE PATCH STATUS: ICD-10-CM

## 2021-10-12 RX ORDER — NORELGESTROMIN AND ETHINYL ESTRADIOL 150; 35 UG/D; UG/D
1 PATCH TRANSDERMAL WEEKLY
Qty: 9 PATCH | Refills: 3 | Status: SHIPPED | OUTPATIENT
Start: 2021-10-12 | End: 2021-10-29 | Stop reason: SDUPTHER

## 2021-10-28 ENCOUNTER — TELEPHONE (OUTPATIENT)
Dept: FAMILY MEDICINE CLINIC | Facility: CLINIC | Age: 21
End: 2021-10-28

## 2021-10-28 NOTE — TELEPHONE ENCOUNTER
Tried calling to let patient know that due to a family emergency her provider will not be in the office. I moved appointment to Same Day schedule at the same time.  No answer left detailed voice message

## 2021-10-29 ENCOUNTER — OFFICE VISIT (OUTPATIENT)
Dept: FAMILY MEDICINE CLINIC | Facility: CLINIC | Age: 21
End: 2021-10-29

## 2021-10-29 ENCOUNTER — TELEPHONE (OUTPATIENT)
Dept: FAMILY MEDICINE CLINIC | Facility: CLINIC | Age: 21
End: 2021-10-29

## 2021-10-29 VITALS
HEIGHT: 67 IN | SYSTOLIC BLOOD PRESSURE: 118 MMHG | BODY MASS INDEX: 45.99 KG/M2 | HEART RATE: 84 BPM | TEMPERATURE: 97.1 F | DIASTOLIC BLOOD PRESSURE: 70 MMHG | OXYGEN SATURATION: 96 % | RESPIRATION RATE: 20 BRPM | WEIGHT: 293 LBS

## 2021-10-29 DIAGNOSIS — Z30.45 CONTRACEPTIVE PATCH STATUS: ICD-10-CM

## 2021-10-29 DIAGNOSIS — J35.01 CHRONIC TONSILLITIS: Primary | ICD-10-CM

## 2021-10-29 DIAGNOSIS — B37.31 VAGINAL YEAST INFECTION: ICD-10-CM

## 2021-10-29 PROCEDURE — 99214 OFFICE O/P EST MOD 30 MIN: CPT | Performed by: NURSE PRACTITIONER

## 2021-10-29 RX ORDER — ALBUTEROL SULFATE 90 UG/1
2 AEROSOL, METERED RESPIRATORY (INHALATION) EVERY 6 HOURS PRN
Qty: 18 G | Refills: 11 | Status: SHIPPED | OUTPATIENT
Start: 2021-10-29 | End: 2023-03-05 | Stop reason: SDUPTHER

## 2021-10-29 RX ORDER — NORELGESTROMIN AND ETHINYL ESTRADIOL 150; 35 UG/D; UG/D
1 PATCH TRANSDERMAL WEEKLY
Qty: 9 PATCH | Refills: 3 | Status: SHIPPED | OUTPATIENT
Start: 2021-10-29 | End: 2021-12-22 | Stop reason: SDUPTHER

## 2021-10-29 RX ORDER — CEFDINIR 300 MG/1
300 CAPSULE ORAL 2 TIMES DAILY
Qty: 20 CAPSULE | Refills: 0 | Status: SHIPPED | OUTPATIENT
Start: 2021-10-29 | End: 2021-11-08

## 2021-10-29 RX ORDER — FLUCONAZOLE 150 MG/1
150 TABLET ORAL DAILY
Qty: 3 TABLET | Refills: 5 | Status: SHIPPED | OUTPATIENT
Start: 2021-10-29 | End: 2022-01-03 | Stop reason: SDUPTHER

## 2021-10-29 NOTE — TELEPHONE ENCOUNTER
She would like her appt with ENT scheduled for 11/19 if possible.  I put her referral in today during her visit.

## 2021-11-15 NOTE — PROGRESS NOTES
Subjective   Jenn Zhong is a 21 y.o. female.     She presents today for evaluation of a sore throat and exudate on her tonsils.  She does have a history of this being recurrent in the past.  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  She would like a referral to ENT for evaluation.  She does need refills on her routine daily medications.  She is otherwise without any other new complaints today in the office.    Sore Throat   This is a recurrent problem. The current episode started 1 to 4 weeks ago. The problem has been gradually worsening. Neither side of throat is experiencing more pain than the other. There has been no fever. The pain is mild. Associated symptoms include ear pain, a hoarse voice and swollen glands. Pertinent negatives include no abdominal pain, congestion, coughing, diarrhea, drooling, ear discharge, headaches, plugged ear sensation, neck pain, shortness of breath, stridor, trouble swallowing or vomiting. The treatment provided no relief.        The following portions of the patient's history were reviewed and updated as appropriate: allergies, current medications, past family history, past medical history, past social history, past surgical history and problem list.    Review of Systems   Constitutional: Negative.    HENT: Positive for ear pain, hoarse voice, sore throat, swollen glands and voice change. Negative for congestion, drooling, ear discharge and trouble swallowing.    Eyes: Negative.    Respiratory: Negative.  Negative for cough, shortness of breath and stridor.    Cardiovascular: Negative.    Gastrointestinal: Negative.  Negative for abdominal pain, diarrhea and vomiting.   Musculoskeletal: Negative.  Negative for neck pain.   Skin: Negative.    Allergic/Immunologic: Negative.    Neurological: Negative.    Hematological: Negative.    Psychiatric/Behavioral: Negative.         Objective   Physical Exam  Vitals reviewed.   Constitutional:       General: She is not in acute distress.     Appearance: She is well-developed. She is morbidly obese. She is not diaphoretic.   HENT:      Head: Normocephalic.      Right Ear: External ear normal.      Left Ear: External ear normal.      Nose: Nose normal.      Mouth/Throat:      Tonsils: Tonsillar exudate present. 2+ on the right. 2+ on the left.   Eyes:      Pupils: Pupils are equal, round, and reactive to light.   Neck:      Thyroid: No thyromegaly.      Vascular: No JVD.   Cardiovascular:      Rate and Rhythm: Normal rate and regular rhythm.      Heart sounds: No murmur heard.  No friction rub. No gallop.    Pulmonary:      Effort: Pulmonary effort is normal. No respiratory distress.      Breath sounds: Normal breath sounds. No wheezing or rales.   Musculoskeletal:         General: Normal range of motion.      Cervical back: Normal range of motion and neck supple.   Skin:     General: Skin is warm and dry.      Coloration: Skin is not pale.      Findings: No erythema or rash.   Neurological:      Mental Status: She is alert and oriented to person, place, and time.   Psychiatric:         Behavior: Behavior normal.         Thought Content: Thought content normal.         Judgment: Judgment normal.           Assessment/Plan   Diagnoses and all orders for this visit:    1. Chronic tonsillitis (Primary)  -     Ambulatory Referral to ENT (Otolaryngology)  -     cefdinir (OMNICEF) 300 MG capsule; Take 1 capsule by mouth 2 (Two) Times a Day for 10 days.  Dispense: 20 capsule; Refill: 0    2. Vaginal yeast infection  -     fluconazole (Diflucan) 150 MG tablet; Take 1 tablet by mouth Daily.  Dispense: 3 tablet; Refill: 5    3. Contraceptive patch status  -     norelgestromin-ethinyl estradiol (Xulane) 150-35 MCG/24HR; Place 1 patch on the skin as directed by provider 1 (One) Time Per Week.  Dispense: 9 patch; Refill: 3    Other orders  -      albuterol sulfate  (90 Base) MCG/ACT inhaler; Inhale 2 puffs Every 6 (Six) Hours As Needed for Wheezing. 2 puffs every 4-6 hours PRN cough/SOA  Dispense: 18 g; Refill: 11               Patient's Body mass index is 46.05 kg/m². indicating that she is morbidly obese (BMI > 40 or > 35 with obesity - related health condition). Obesity-related health conditions include the following: none. Obesity is worsening. BMI is is above average; BMI management plan is completed. We discussed portion control and increasing exercise..    Plenty fluids.  Finish all antibiotics.  Diflucan as needed for yeast symptoms.  Referral to ENT for evaluation.  Continue all other current medications.  Follow up as scheduled for routine follow up.  Follow up sooner for problems/concerns.  Patient verbalized understanding and agreement with plan of care.        This document has been electronically signed by CINDY Ray on November 14, 2021 21:41 CST

## 2021-12-01 ENCOUNTER — TELEPHONE (OUTPATIENT)
Dept: FAMILY MEDICINE CLINIC | Facility: CLINIC | Age: 21
End: 2021-12-01

## 2021-12-01 NOTE — TELEPHONE ENCOUNTER
Patient called she said she is having her tonsils removed on January the 27th. She asked if she could please get a excuse for her to do light duty at work until she has her tonsils removed.

## 2021-12-22 DIAGNOSIS — Z30.45 CONTRACEPTIVE PATCH STATUS: ICD-10-CM

## 2021-12-30 RX ORDER — NORELGESTROMIN AND ETHINYL ESTRADIOL 150; 35 UG/D; UG/D
1 PATCH TRANSDERMAL WEEKLY
Qty: 9 PATCH | Refills: 3 | Status: SHIPPED | OUTPATIENT
Start: 2021-12-30 | End: 2023-01-17

## 2022-01-02 DIAGNOSIS — B37.31 VAGINAL YEAST INFECTION: ICD-10-CM

## 2022-01-03 DIAGNOSIS — B37.31 VAGINAL YEAST INFECTION: ICD-10-CM

## 2022-01-03 RX ORDER — FLUCONAZOLE 150 MG/1
150 TABLET ORAL DAILY
Qty: 3 TABLET | Refills: 5 | OUTPATIENT
Start: 2022-01-03

## 2022-01-04 RX ORDER — FLUCONAZOLE 150 MG/1
150 TABLET ORAL DAILY
Qty: 3 TABLET | Refills: 5 | Status: SHIPPED | OUTPATIENT
Start: 2022-01-04 | End: 2022-03-11 | Stop reason: SDUPTHER

## 2022-01-05 ENCOUNTER — LAB (OUTPATIENT)
Dept: LAB | Facility: HOSPITAL | Age: 22
End: 2022-01-05

## 2022-01-05 LAB
25(OH)D3 SERPL-MCNC: 14.7 NG/ML (ref 30–100)
ALBUMIN SERPL-MCNC: 4.6 G/DL (ref 3.5–5.2)
ALBUMIN/GLOB SERPL: 1.4 G/DL
ALP SERPL-CCNC: 69 U/L (ref 39–117)
ALT SERPL W P-5'-P-CCNC: 12 U/L (ref 1–33)
ANION GAP SERPL CALCULATED.3IONS-SCNC: 8 MMOL/L (ref 5–15)
AST SERPL-CCNC: 17 U/L (ref 1–32)
BASOPHILS # BLD AUTO: 0.02 10*3/MM3 (ref 0–0.2)
BASOPHILS NFR BLD AUTO: 0.2 % (ref 0–1.5)
BILIRUB SERPL-MCNC: 0.5 MG/DL (ref 0–1.2)
BUN SERPL-MCNC: 10 MG/DL (ref 6–20)
BUN/CREAT SERPL: 12.7 (ref 7–25)
CALCIUM SPEC-SCNC: 9.8 MG/DL (ref 8.6–10.5)
CHLORIDE SERPL-SCNC: 105 MMOL/L (ref 98–107)
CHOLEST SERPL-MCNC: 182 MG/DL (ref 0–200)
CO2 SERPL-SCNC: 27 MMOL/L (ref 22–29)
CREAT SERPL-MCNC: 0.79 MG/DL (ref 0.57–1)
DEPRECATED RDW RBC AUTO: 37.3 FL (ref 37–54)
EOSINOPHIL # BLD AUTO: 0.11 10*3/MM3 (ref 0–0.4)
EOSINOPHIL NFR BLD AUTO: 1.3 % (ref 0.3–6.2)
ERYTHROCYTE [DISTWIDTH] IN BLOOD BY AUTOMATED COUNT: 12.4 % (ref 12.3–15.4)
GFR SERPL CREATININE-BSD FRML MDRD: 111 ML/MIN/1.73
GLOBULIN UR ELPH-MCNC: 3.2 GM/DL
GLUCOSE SERPL-MCNC: 92 MG/DL (ref 65–99)
HBA1C MFR BLD: 5.44 % (ref 4.8–5.6)
HCT VFR BLD AUTO: 39 % (ref 34–46.6)
HDLC SERPL-MCNC: 44 MG/DL (ref 40–60)
HGB BLD-MCNC: 12.4 G/DL (ref 12–15.9)
IMM GRANULOCYTES # BLD AUTO: 0.01 10*3/MM3 (ref 0–0.05)
IMM GRANULOCYTES NFR BLD AUTO: 0.1 % (ref 0–0.5)
LDLC SERPL CALC-MCNC: 127 MG/DL (ref 0–100)
LDLC/HDLC SERPL: 2.89 {RATIO}
LYMPHOCYTES # BLD AUTO: 2.77 10*3/MM3 (ref 0.7–3.1)
LYMPHOCYTES NFR BLD AUTO: 32.2 % (ref 19.6–45.3)
MCH RBC QN AUTO: 26.6 PG (ref 26.6–33)
MCHC RBC AUTO-ENTMCNC: 31.8 G/DL (ref 31.5–35.7)
MCV RBC AUTO: 83.5 FL (ref 79–97)
MONOCYTES # BLD AUTO: 0.62 10*3/MM3 (ref 0.1–0.9)
MONOCYTES NFR BLD AUTO: 7.2 % (ref 5–12)
NEUTROPHILS NFR BLD AUTO: 5.06 10*3/MM3 (ref 1.7–7)
NEUTROPHILS NFR BLD AUTO: 59 % (ref 42.7–76)
NRBC BLD AUTO-RTO: 0 /100 WBC (ref 0–0.2)
PLATELET # BLD AUTO: 352 10*3/MM3 (ref 140–450)
PMV BLD AUTO: 11.4 FL (ref 6–12)
POTASSIUM SERPL-SCNC: 4.9 MMOL/L (ref 3.5–5.2)
PROT SERPL-MCNC: 7.8 G/DL (ref 6–8.5)
RBC # BLD AUTO: 4.67 10*6/MM3 (ref 3.77–5.28)
SODIUM SERPL-SCNC: 140 MMOL/L (ref 136–145)
TRIGL SERPL-MCNC: 55 MG/DL (ref 0–150)
TSH SERPL DL<=0.05 MIU/L-ACNC: 1.57 UIU/ML (ref 0.27–4.2)
VLDLC SERPL-MCNC: 11 MG/DL (ref 5–40)
WBC NRBC COR # BLD: 8.59 10*3/MM3 (ref 3.4–10.8)

## 2022-01-05 PROCEDURE — 82306 VITAMIN D 25 HYDROXY: CPT | Performed by: NURSE PRACTITIONER

## 2022-01-05 PROCEDURE — 85025 COMPLETE CBC W/AUTO DIFF WBC: CPT | Performed by: NURSE PRACTITIONER

## 2022-01-05 PROCEDURE — 80061 LIPID PANEL: CPT | Performed by: NURSE PRACTITIONER

## 2022-01-05 PROCEDURE — 80053 COMPREHEN METABOLIC PANEL: CPT | Performed by: NURSE PRACTITIONER

## 2022-01-05 PROCEDURE — 84443 ASSAY THYROID STIM HORMONE: CPT | Performed by: NURSE PRACTITIONER

## 2022-01-05 PROCEDURE — 83036 HEMOGLOBIN GLYCOSYLATED A1C: CPT | Performed by: NURSE PRACTITIONER

## 2022-01-07 DIAGNOSIS — E55.9 VITAMIN D DEFICIENCY DISEASE: Primary | ICD-10-CM

## 2022-01-07 RX ORDER — ERGOCALCIFEROL 1.25 MG/1
50000 CAPSULE ORAL WEEKLY
Qty: 12 CAPSULE | Refills: 3 | Status: SHIPPED | OUTPATIENT
Start: 2022-01-07

## 2022-03-11 DIAGNOSIS — B37.31 VAGINAL YEAST INFECTION: ICD-10-CM

## 2022-03-11 RX ORDER — CETIRIZINE HYDROCHLORIDE, PSEUDOEPHEDRINE HYDROCHLORIDE 5; 120 MG/1; MG/1
1 TABLET, FILM COATED, EXTENDED RELEASE ORAL DAILY
Qty: 5 TABLET | Refills: 0 | Status: SHIPPED | OUTPATIENT
Start: 2022-03-11 | End: 2022-05-13 | Stop reason: ALTCHOICE

## 2022-03-11 RX ORDER — AZITHROMYCIN 500 MG/1
500 TABLET, FILM COATED ORAL DAILY
Qty: 5 TABLET | Refills: 0 | Status: SHIPPED | OUTPATIENT
Start: 2022-03-11 | End: 2022-03-16

## 2022-03-11 RX ORDER — FLUCONAZOLE 150 MG/1
150 TABLET ORAL DAILY
Qty: 3 TABLET | Refills: 5 | Status: SHIPPED | OUTPATIENT
Start: 2022-03-11 | End: 2022-07-22 | Stop reason: SDUPTHER

## 2022-05-10 ENCOUNTER — PATIENT MESSAGE (OUTPATIENT)
Dept: FAMILY MEDICINE CLINIC | Facility: CLINIC | Age: 22
End: 2022-05-10

## 2022-05-10 DIAGNOSIS — J30.2 SEASONAL ALLERGIES: Primary | ICD-10-CM

## 2022-05-13 RX ORDER — FLUTICASONE PROPIONATE 50 MCG
2 SPRAY, SUSPENSION (ML) NASAL DAILY
Qty: 16 G | Refills: 5 | Status: SHIPPED | OUTPATIENT
Start: 2022-05-13

## 2022-05-13 RX ORDER — MONTELUKAST SODIUM 10 MG/1
10 TABLET ORAL NIGHTLY
Qty: 30 TABLET | Refills: 5 | Status: SHIPPED | OUTPATIENT
Start: 2022-05-13

## 2022-05-13 RX ORDER — LEVOCETIRIZINE DIHYDROCHLORIDE 5 MG/1
5 TABLET, FILM COATED ORAL EVERY EVENING
Qty: 30 TABLET | Refills: 5 | Status: SHIPPED | OUTPATIENT
Start: 2022-05-13 | End: 2023-03-01

## 2022-05-13 NOTE — TELEPHONE ENCOUNTER
From: Jenn Zhong  To: CINDY Ray  Sent: 5/10/2022 9:45 AM CDT  Subject: Allergy Medicine     Hi ,     Can I please get something for my allergies! I normally take Zyrtec but nothing seems to be working. I’m very nasally, runny nose, coughing, itchy eyes, and lots of sneezing! I have been using my inhaler for my cough because my chest gets tight. If you send anything, could you please send it to the [facility] here in [place] on [facility]!     Thank you,     Jenn!

## 2022-05-26 DIAGNOSIS — B37.31 VAGINAL YEAST INFECTION: ICD-10-CM

## 2022-05-26 RX ORDER — FLUCONAZOLE 150 MG/1
150 TABLET ORAL DAILY
Qty: 3 TABLET | Refills: 5 | OUTPATIENT
Start: 2022-05-26

## 2022-05-31 NOTE — TELEPHONE ENCOUNTER
She is now 21 years old and needs to est care with a gynecologist for gynecologic care.  She is of age to get a PAP smear now.

## 2022-05-31 NOTE — TELEPHONE ENCOUNTER
Patient would like a call back in regards to her fluconazole (diflucan) refill that was denied.  Call back number: [phone]

## 2022-07-21 ENCOUNTER — PROCEDURE VISIT (OUTPATIENT)
Dept: FAMILY MEDICINE CLINIC | Facility: CLINIC | Age: 22
End: 2022-07-21

## 2022-07-21 VITALS
SYSTOLIC BLOOD PRESSURE: 120 MMHG | HEART RATE: 82 BPM | HEIGHT: 67 IN | RESPIRATION RATE: 20 BRPM | DIASTOLIC BLOOD PRESSURE: 70 MMHG | TEMPERATURE: 97.4 F | BODY MASS INDEX: 44.84 KG/M2 | OXYGEN SATURATION: 98 % | WEIGHT: 285.7 LBS

## 2022-07-21 DIAGNOSIS — E66.01 MORBID OBESITY WITH BMI OF 40.0-44.9, ADULT: ICD-10-CM

## 2022-07-21 DIAGNOSIS — N76.0 RECURRENT VAGINITIS: ICD-10-CM

## 2022-07-21 DIAGNOSIS — Z01.419 ENCOUNTER FOR GYNECOLOGICAL EXAMINATION: Primary | ICD-10-CM

## 2022-07-21 PROCEDURE — 87510 GARDNER VAG DNA DIR PROBE: CPT | Performed by: NURSE PRACTITIONER

## 2022-07-21 PROCEDURE — 87660 TRICHOMONAS VAGIN DIR PROBE: CPT | Performed by: NURSE PRACTITIONER

## 2022-07-21 PROCEDURE — 87661 TRICHOMONAS VAGINALIS AMPLIF: CPT | Performed by: NURSE PRACTITIONER

## 2022-07-21 PROCEDURE — 87591 N.GONORRHOEAE DNA AMP PROB: CPT | Performed by: NURSE PRACTITIONER

## 2022-07-21 PROCEDURE — 99395 PREV VISIT EST AGE 18-39: CPT | Performed by: NURSE PRACTITIONER

## 2022-07-21 PROCEDURE — 87480 CANDIDA DNA DIR PROBE: CPT | Performed by: NURSE PRACTITIONER

## 2022-07-21 PROCEDURE — 87491 CHLMYD TRACH DNA AMP PROBE: CPT | Performed by: NURSE PRACTITIONER

## 2022-07-21 NOTE — PROGRESS NOTES
Subjective   Jenn Zhong is a 21 y.o. female.     She presents today for her first routine GYN exam.  She does have complaints of vaginal discharge and recurrent yeast infections.  She denies any pain, bleeding, breast lumps, etc.  She has lost 9 lbs since her last office visit.  She is otherwise without any other new complaints today in the office.  Her BMI is 44.7%.    Gynecologic Exam  The patient's primary symptoms include vaginal discharge. The patient's pertinent negatives include no genital itching, genital lesions, genital odor, genital rash, missed menses, pelvic pain or vaginal bleeding. The patient is experiencing no pain. She is not pregnant. Pertinent negatives include no abdominal pain, anorexia, back pain, chills, constipation, diarrhea, discolored urine, dysuria, fever, flank pain, frequency, headaches, hematuria, joint pain, joint swelling, nausea, painful intercourse, rash, sore throat, urgency or vomiting. There has been no bleeding. She is sexually active. She uses a patch for contraception. Her menstrual history has been regular. There is no history of an abdominal surgery, a  section, an ectopic pregnancy, endometriosis, a gynecological surgery, herpes simplex, menorrhagia, metrorrhagia, miscarriage, ovarian cysts, perineal abscess, PID, an STD, a terminated pregnancy or vaginosis.        The following portions of the patient's history were reviewed and updated as appropriate: allergies, current medications, past family history, past medical history, past social history, past surgical history and problem list.    Review of Systems   Constitutional: Negative.  Negative for chills and fever.   HENT: Negative.  Negative for sore throat.    Eyes: Negative.    Respiratory: Negative.    Cardiovascular: Negative.    Gastrointestinal: Negative.  Negative for abdominal pain, anorexia, constipation, diarrhea, nausea and vomiting.   Genitourinary: Positive for menstrual problem and  vaginal discharge. Negative for amenorrhea, breast discharge, breast lump, breast pain, decreased libido, decreased urine volume, difficulty urinating, dyspareunia, dysuria, flank pain, frequency, genital sores, hematuria, menorrhagia, missed menses, pelvic pain, pelvic pressure, urgency, urinary incontinence, vaginal bleeding and vaginal pain.   Musculoskeletal: Negative.  Negative for back pain and joint pain.   Skin: Negative.  Negative for rash.   Allergic/Immunologic: Negative.    Neurological: Negative.    Hematological: Negative.    Psychiatric/Behavioral: Negative.        Objective   Physical Exam  Vitals reviewed.   Constitutional:       General: She is not in acute distress.     Appearance: She is well-developed. She is not diaphoretic.   HENT:      Head: Normocephalic.      Right Ear: External ear normal.      Left Ear: External ear normal.      Nose: Nose normal.   Eyes:      Pupils: Pupils are equal, round, and reactive to light.   Neck:      Thyroid: No thyromegaly.      Vascular: No JVD.   Cardiovascular:      Rate and Rhythm: Normal rate and regular rhythm.      Heart sounds: No murmur heard.    No friction rub. No gallop.   Pulmonary:      Effort: Pulmonary effort is normal. No respiratory distress.      Breath sounds: Normal breath sounds. No wheezing or rales.   Chest:   Breasts: Breasts are symmetrical.      Right: No inverted nipple, mass, nipple discharge, skin change or tenderness.      Left: No inverted nipple, mass, nipple discharge, skin change or tenderness.       Genitourinary:     Exam position: Supine.      Labia:         Right: No rash, tenderness, lesion or injury.         Left: No rash, tenderness, lesion or injury.       Vagina: No signs of injury and foreign body. Vaginal discharge present. No erythema, tenderness, bleeding or lesions.      Cervix: Discharge and friability present. No cervical motion tenderness, lesion, erythema, cervical bleeding or eversion.      Adnexa:          Right: No mass, tenderness or fullness.          Left: No mass, tenderness or fullness.     Musculoskeletal:         General: Normal range of motion.      Cervical back: Normal range of motion and neck supple.   Skin:     General: Skin is warm and dry.      Coloration: Skin is not pale.      Findings: No erythema or rash.   Neurological:      Mental Status: She is alert and oriented to person, place, and time.   Psychiatric:         Behavior: Behavior normal.         Thought Content: Thought content normal.         Judgment: Judgment normal.           Assessment & Plan   Diagnoses and all orders for this visit:    1. Encounter for gynecological examination (Primary)  -     LIQUID-BASED PAP SMEAR, [facility] ([facility])    2. Recurrent vaginitis  -     Gardnerella vaginalis, Trichomonas vaginalis, Candida albicans, DNA - Swab, Vagina  -     Chlamydia trachomatis, Neisseria gonorrhoeae, Trichomonas vaginalis, PCR - Swab, Cervix    3. Morbid obesity with BMI of 40.0-44.9, adult (HCC)  Comments:  BMI 44.7%               Class 3 Severe Obesity (BMI >=40). Obesity-related health conditions include the following: none. Obesity is improving with lifestyle modifications. BMI is is above average; BMI management plan is completed. We discussed portion control and increasing exercise.    Anticipatory guidance provided at discharge.  We will notify her with results when they become available.  Continue current medications.  Follow up as scheduled for routine follow up.  Follow up sooner for problems/concerns.  Patient verbalized understanding and agreement with plan of care.        This document has been electronically signed by CINDY Ray on July 21, 2022 16:09 CDT

## 2022-07-22 ENCOUNTER — DOCUMENTATION (OUTPATIENT)
Dept: FAMILY MEDICINE CLINIC | Facility: CLINIC | Age: 22
End: 2022-07-22

## 2022-07-22 DIAGNOSIS — N76.0 BV (BACTERIAL VAGINOSIS): ICD-10-CM

## 2022-07-22 DIAGNOSIS — B96.89 BV (BACTERIAL VAGINOSIS): ICD-10-CM

## 2022-07-22 DIAGNOSIS — B37.31 VAGINAL YEAST INFECTION: Primary | ICD-10-CM

## 2022-07-22 LAB
CANDIDA ALBICANS: NEGATIVE
GARDNERELLA VAGINALIS: POSITIVE
T VAGINALIS DNA VAG QL PROBE+SIG AMP: NEGATIVE

## 2022-07-22 RX ORDER — FLUCONAZOLE 150 MG/1
150 TABLET ORAL DAILY
Qty: 3 TABLET | Refills: 5 | Status: SHIPPED | OUTPATIENT
Start: 2022-07-22 | End: 2022-09-10 | Stop reason: SDUPTHER

## 2022-07-22 RX ORDER — METRONIDAZOLE 500 MG/1
500 TABLET ORAL 2 TIMES DAILY
Qty: 14 TABLET | Refills: 0 | Status: SHIPPED | OUTPATIENT
Start: 2022-07-22 | End: 2022-07-29

## 2022-07-23 LAB
C TRACH RRNA CVX QL NAA+PROBE: NEGATIVE
N GONORRHOEA RRNA SPEC QL NAA+PROBE: NEGATIVE
TRICHOMONAS VAGINALIS PCR: NEGATIVE

## 2022-07-28 LAB — REF LAB TEST METHOD: NORMAL

## 2022-07-29 ENCOUNTER — TELEPHONE (OUTPATIENT)
Dept: FAMILY MEDICINE CLINIC | Facility: CLINIC | Age: 22
End: 2022-07-29

## 2022-07-29 DIAGNOSIS — R87.612 LGSIL ON PAP SMEAR OF CERVIX: Primary | ICD-10-CM

## 2022-07-29 NOTE — TELEPHONE ENCOUNTER
I called and talked to patient about colpo- patient voiced understanding. Patient states if she could see someone at [facility] in [place] that would be great.

## 2022-07-29 NOTE — TELEPHONE ENCOUNTER
PATIENT WOULD LIKE A CALL BACK. STATED THAT SHE WOULD LIKE FURTHER [facility]PLANATION [facility]

## 2022-07-29 NOTE — TELEPHONE ENCOUNTER
----- Message from CINDY Ray sent at 7/28/2022  5:05 PM CDT -----  Her PAP smear shows epithelial cell abnormality. (LSIL) Low Grade Squamous Intraepithelial Lesion.     RECOMMENDATION:  Colposcopic exam and biopsies are suggested, if   clinically indicated.     I would recommend a referral to GYN for colposcopic exam.  Would she like a referral to someone in [place] or here in [place]?

## 2022-09-10 DIAGNOSIS — B37.31 VAGINAL YEAST INFECTION: ICD-10-CM

## 2022-09-12 NOTE — TELEPHONE ENCOUNTER
Rx Refill Note  Requested Prescriptions     Pending Prescriptions Disp Refills   • fluconazole (Diflucan) 150 MG tablet 3 tablet 5     Sig: Take 1 tablet by mouth Daily.      Last office visit with prescribing clinician: 10/29/2021      Next office visit with prescribing clinician: 7/25/2023   {TIP  Encounters    LAST RX SENT ON 7/22/22 #3 X 5 REFILLS    {TIP  Please add Last Relevant Lab Date if appropriate  {TIP  Is Refill Pharmacy correct? YES  Larry Yang LPN  09/12/22, 08:57 CDT

## 2022-09-13 ENCOUNTER — TELEPHONE (OUTPATIENT)
Dept: FAMILY MEDICINE CLINIC | Facility: CLINIC | Age: 22
End: 2022-09-13

## 2022-09-13 RX ORDER — FLUCONAZOLE 150 MG/1
150 TABLET ORAL DAILY
Qty: 3 TABLET | Refills: 5 | Status: SHIPPED | OUTPATIENT
Start: 2022-09-13 | End: 2023-03-08 | Stop reason: SDUPTHER

## 2022-09-13 NOTE — TELEPHONE ENCOUNTER
Patient called in regards to her diflucan medication refill. If any problems, she would like a call back.  Call back number: [phone]

## 2022-09-13 NOTE — TELEPHONE ENCOUNTER
It looks like it was sent for 3 pills with 5 refills 1 month ago.  She shouldn't have gone through that many that fast?

## 2022-09-28 ENCOUNTER — OFFICE VISIT (OUTPATIENT)
Dept: OBSTETRICS AND GYNECOLOGY | Facility: CLINIC | Age: 22
End: 2022-09-28

## 2022-09-28 VITALS
WEIGHT: 288.2 LBS | BODY MASS INDEX: 45.24 KG/M2 | DIASTOLIC BLOOD PRESSURE: 78 MMHG | SYSTOLIC BLOOD PRESSURE: 126 MMHG | HEIGHT: 67 IN

## 2022-09-28 DIAGNOSIS — Z11.3 SCREENING FOR STDS (SEXUALLY TRANSMITTED DISEASES): ICD-10-CM

## 2022-09-28 DIAGNOSIS — R87.612 LGSIL ON PAP SMEAR OF CERVIX: Primary | ICD-10-CM

## 2022-09-28 LAB
B-HCG UR QL: NEGATIVE
BILIRUB BLD-MCNC: NEGATIVE MG/DL
CLARITY, POC: CLEAR
COLOR UR: YELLOW
EXPIRATION DATE: NORMAL
GLUCOSE UR STRIP-MCNC: NEGATIVE MG/DL
INTERNAL NEGATIVE CONTROL: NORMAL
INTERNAL POSITIVE CONTROL: NORMAL
KETONES UR QL: NEGATIVE
LEUKOCYTE EST, POC: NEGATIVE
Lab: NORMAL
NITRITE UR-MCNC: NEGATIVE MG/ML
PH UR: 5 [PH] (ref 5–8)
PROT UR STRIP-MCNC: NEGATIVE MG/DL
RBC # UR STRIP: ABNORMAL /UL
SP GR UR: 1.02 (ref 1–1.03)
UROBILINOGEN UR QL: NORMAL

## 2022-09-28 PROCEDURE — 81002 URINALYSIS NONAUTO W/O SCOPE: CPT | Performed by: OBSTETRICS & GYNECOLOGY

## 2022-09-28 PROCEDURE — 57454 BX/CURETT OF CERVIX W/SCOPE: CPT | Performed by: OBSTETRICS & GYNECOLOGY

## 2022-09-28 PROCEDURE — 81025 URINE PREGNANCY TEST: CPT | Performed by: OBSTETRICS & GYNECOLOGY

## 2022-09-28 PROCEDURE — 99203 OFFICE O/P NEW LOW 30 MIN: CPT | Performed by: OBSTETRICS & GYNECOLOGY

## 2022-09-29 LAB
C TRACH RRNA SPEC QL NAA+PROBE: NEGATIVE
HBV SURFACE AG SERPL QL IA: NEGATIVE
HCV AB S/CO SERPL IA: <0.1 S/CO RATIO (ref 0–0.9)
HIV 1+2 AB+HIV1 P24 AG SERPL QL IA: NON REACTIVE
HSV1 IGG SER IA-ACNC: 0.99 INDEX (ref 0–0.9)
HSV2 IGG SER IA-ACNC: 10.7 INDEX (ref 0–0.9)
N GONORRHOEA RRNA SPEC QL NAA+PROBE: NEGATIVE
RPR SER QL: NON REACTIVE
T VAGINALIS RRNA SPEC QL NAA+PROBE: NEGATIVE

## 2022-09-30 ENCOUNTER — TELEPHONE (OUTPATIENT)
Dept: OBSTETRICS AND GYNECOLOGY | Facility: CLINIC | Age: 22
End: 2022-09-30

## 2022-09-30 ENCOUNTER — PATIENT MESSAGE (OUTPATIENT)
Dept: OBSTETRICS AND GYNECOLOGY | Facility: CLINIC | Age: 22
End: 2022-09-30

## 2022-09-30 LAB
DX ICD CODE: NORMAL
DX ICD CODE: NORMAL
PATH REPORT.FINAL DX SPEC: NORMAL
PATH REPORT.GROSS SPEC: NORMAL
PATH REPORT.SITE OF ORIGIN SPEC: NORMAL
PATHOLOGIST NAME: NORMAL
PAYMENT PROCEDURE: NORMAL

## 2022-09-30 NOTE — TELEPHONE ENCOUNTER
Caller: Jenn Zhong'A    Relationship to patient: Self    Best call back number: [phone]    Patient is needing: PT WAS RETURNING A CALL FROM [facility] - PT CAN BE REACHED AT ANYTIME AND YOU CAN LVM IF NEEDED.

## 2022-09-30 NOTE — TELEPHONE ENCOUNTER
From: Jenn Zhong  To: Mallory Vega MD  Sent: 9/30/2022 10:33 AM EDT  Subject: Results     Rakesh Tejada,     I just wanted a phone call or a message on the results from my swab and blood work from yesterday. I see my results but I just need a few things clarified to me.

## 2022-10-03 NOTE — PROGRESS NOTES
PIP= biopsies shows low grade changes only, c/w her pap smear. Repeat pap in 6 months. Please place in colpo recall.

## 2022-10-05 ENCOUNTER — OFFICE VISIT (OUTPATIENT)
Dept: OBSTETRICS AND GYNECOLOGY | Facility: CLINIC | Age: 22
End: 2022-10-05

## 2022-10-05 VITALS
WEIGHT: 287.4 LBS | SYSTOLIC BLOOD PRESSURE: 110 MMHG | DIASTOLIC BLOOD PRESSURE: 68 MMHG | BODY MASS INDEX: 45.11 KG/M2 | HEIGHT: 67 IN

## 2022-10-05 DIAGNOSIS — R76.8 HSV-2 SEROPOSITIVE: ICD-10-CM

## 2022-10-05 DIAGNOSIS — R87.612 LGSIL ON PAP SMEAR OF CERVIX: Primary | ICD-10-CM

## 2022-10-05 PROCEDURE — 99214 OFFICE O/P EST MOD 30 MIN: CPT | Performed by: OBSTETRICS & GYNECOLOGY

## 2022-10-05 RX ORDER — VALACYCLOVIR HYDROCHLORIDE 500 MG/1
500 TABLET, FILM COATED ORAL DAILY
Qty: 90 TABLET | Refills: 3 | Status: SHIPPED | OUTPATIENT
Start: 2022-10-05 | End: 2022-10-08

## 2022-10-05 NOTE — PROGRESS NOTES
"      Jenn Zhong is a 22 y.o. patient who presents for follow up of   Chief Complaint   Patient presents with   • DISCUSS LAB RESULTS       21 yo est pt here for discussion of her HSV 1 & 2 IGG.  She has never had an outbreak and is interested in hearing about this diagnosis. She had PAMELA 1 on her cervical biopsies.       The following portions of the patient's history were reviewed and updated as appropriate: allergies, current medications and problem list.    Review of Systems   Constitutional: Positive for activity change.   Genitourinary: Negative for menstrual problem, pelvic pain, vaginal bleeding, vaginal discharge and vaginal pain.   Psychiatric/Behavioral: The patient is nervous/anxious.        /68   Ht 170.2 cm (67\")   Wt 130 kg (287 lb 6.4 oz)   LMP 09/22/2022 (Exact Date)   BMI 45.01 kg/m²     Physical Exam  Vitals and nursing note reviewed.   Constitutional:       Appearance: Normal appearance. She is well-developed. She is obese.   HENT:      Head: Normocephalic and atraumatic.   Eyes:      General: No scleral icterus.     Conjunctiva/sclera: Conjunctivae normal.   Neck:      Thyroid: No thyromegaly.   Neurological:      Mental Status: She is alert and oriented to person, place, and time.   Psychiatric:         Behavior: Behavior normal.         Thought Content: Thought content normal.         Judgment: Judgment normal.         A/P:  1. 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 Repeat pap in 6 months. Importance of f/u d/w pt.   2. HSV 2 seropositive- Herpes viruses are viruses that we can be exposed to and once we are exposed, we keep them with us always.  Often these are decades between our exposure to the virus and when we are discovered to “have the virus.”  There are 2 main types of herpes viruses that we diagnose in gynecology.  The first is HSV 1, which is also known as the “cold sore” virus.  80% of adults will test positive for this virus even if they have never had a cold sore.  It is " a common virus transmitted in childhood from chewing on one another’s toys.  HSV 1 can cause genital sores if it is spread from the mouth to the genitals during oral sex.  HSV 2 is the most common form of genital herpes and is highly contagious.  About 15-20% of Americans carry the genital herpes virus.  HSV 2 is more common in women than men, with 1 in 5 women infected compared to 1 in 10 men.  It is also transmitted more easily from men to women than it is from women to men.  Many people have no symptoms and have never had an outbreak of painful genital sores.  HSV 2 is most contagious when genital sores are present, but it can be transmitted even when no lesions or pain is present.  If you have a partner who is not infected and you are infected, consider suppression therapy daily to help decrease risk of transmission.  Also, consider suppression if you have frequent outbreaks or are pregnant.  In order to avoid genital herpes, limit number of sexual partners and use condoms with every act of sex. She would like to be on suppression. ERX Valtrex 500 mg QD.  3. Time Spent: I spent >30 minutes caring for Jenn on this date of service. This time includes time spent by me in the following activities: preparing for the visit, reviewing tests, obtaining and/or reviewing a separately obtained history, performing a medically appropriate examination and/or evaluation, counseling and educating the patient/family/caregiver, ordering medications, tests, or procedures and documenting information in the medical record.       Assessment & Plan   Diagnoses and all orders for this visit:    1. LGSIL on Pap smear of cervix (Primary)    2. HSV-2 seropositive    Other orders  -     valACYclovir (VALTREX) 500 MG tablet; Take 1 tablet by mouth Daily for 3 days.  Dispense: 90 tablet; Refill: 3                 No follow-ups on file.      Mallory Vega MD    10/5/2022  14:43 EDT

## 2022-11-04 DIAGNOSIS — B37.31 VAGINAL YEAST INFECTION: ICD-10-CM

## 2022-11-04 RX ORDER — FLUCONAZOLE 150 MG/1
150 TABLET ORAL DAILY
Qty: 3 TABLET | Refills: 5 | OUTPATIENT
Start: 2022-11-04

## 2022-12-06 DIAGNOSIS — F41.9 ANXIETY: ICD-10-CM

## 2022-12-06 RX ORDER — BUPROPION HYDROCHLORIDE 150 MG/1
150 TABLET ORAL DAILY
Qty: 90 TABLET | Refills: 1 | Status: SHIPPED | OUTPATIENT
Start: 2022-12-06

## 2022-12-06 NOTE — TELEPHONE ENCOUNTER
Rx Refill Note  Requested Prescriptions     Pending Prescriptions Disp Refills   • buPROPion XL (Wellbutrin XL) 150 MG 24 hr tablet 90 tablet 1     Sig: Take 1 tablet by mouth Daily.      Last office visit with prescribing clinician: 10/29/2021   Last telemedicine visit with prescribing clinician: Visit date not found   Next office visit with prescribing clinician: 7/25/2023   {TIP  Encounters:    PT HAS NOT BEEN SEEN IN OVER A YEAR.  THIS MEDICATION WAS LAST REFILLED 12/2020.    {TIP  Please add Last Relevant Lab Date if appropriate              {TIP  Is Refill Pharmacy correct? YES    Would you like a call back once the refill request has been completed: [] Yes [] No    If the office needs to give you a call back, can they leave a voicemail: [] Yes [] No    Larry Yang LPN  12/06/22, 11:26 CST

## 2022-12-29 ENCOUNTER — TELEPHONE (OUTPATIENT)
Dept: FAMILY MEDICINE CLINIC | Facility: CLINIC | Age: 22
End: 2022-12-29

## 2022-12-29 DIAGNOSIS — L50.9 HIVES: Primary | ICD-10-CM

## 2022-12-29 NOTE — TELEPHONE ENCOUNTER
I can put in a referral for an allergy referral, she will just need to let us know which allergy provider she would like to see.

## 2022-12-29 NOTE — TELEPHONE ENCOUNTER
Left Pt a message to inform her that she needs to let us know the name of an allergy Doctor name she would like to be referred.

## 2022-12-29 NOTE — TELEPHONE ENCOUNTER
Patient called stating that she has had hives off and on for the last 2 weeks.  Patient doesn't know if it is stress related or if she needs a referral for allergy testing.  Patient would like a return call

## 2023-01-16 DIAGNOSIS — Z30.45 CONTRACEPTIVE PATCH STATUS: ICD-10-CM

## 2023-01-16 NOTE — TELEPHONE ENCOUNTER
Rx Refill Note  Requested Prescriptions     Pending Prescriptions Disp Refills   • Xulane 150-35 MCG/24HR [Pharmacy Med Name: Xulane 150-35 MCG/24HR Transdermal Patch Weekly] 9 patch 0     Sig: APPLY 1 PATCH TOPICALLY ONCE A WEEK      Last office visit with prescribing clinician: 7/21/2022   Last telemedicine visit with prescribing clinician:   Next office visit with prescribing clinician: 7/25/23  {TIP  Encounters    {TIP  Please add Last Relevant Lab Date if appropriate              {TIP  Is Refill Pharmacy correct? YES    Would you like a call back once the refill request has been completed: [] Yes [] No    If the office needs to give you a call back, can they leave a voicemail: [] Yes [] No    Larry Yang LPN  01/16/23, 11:58 CST

## 2023-01-17 RX ORDER — NORELGESTROMIN AND ETHINYL ESTRADIOL 150; 35 UG/D; UG/D
PATCH TRANSDERMAL
Qty: 9 PATCH | Refills: 3 | Status: SHIPPED | OUTPATIENT
Start: 2023-01-17 | End: 2023-03-08 | Stop reason: SDUPTHER

## 2023-03-01 ENCOUNTER — PATIENT MESSAGE (OUTPATIENT)
Dept: FAMILY MEDICINE CLINIC | Facility: CLINIC | Age: 23
End: 2023-03-01
Payer: COMMERCIAL

## 2023-03-01 DIAGNOSIS — J30.2 SEASONAL ALLERGIES: Primary | ICD-10-CM

## 2023-03-01 RX ORDER — CETIRIZINE HYDROCHLORIDE 10 MG/1
10 TABLET ORAL DAILY
Qty: 90 TABLET | Refills: 1 | Status: SHIPPED | OUTPATIENT
Start: 2023-03-01

## 2023-03-01 NOTE — TELEPHONE ENCOUNTER
From: Norberto Zhong  To: Aileen Colorado  Sent: 3/1/2023 8:33 AM CST  Subject: Allergies     Hi ,    Can I please get something sent in for this terrible cough and congestion, and my allergies that are stronger if not similar to Zertec? Unfortunately, it’s not on the shelves here in [place] due to allergy season sneaking upon us. You can send this to the [facility] on [facility] here in [place].     Thank you,     Norberto Zhong

## 2023-03-06 RX ORDER — ALBUTEROL SULFATE 90 UG/1
2 AEROSOL, METERED RESPIRATORY (INHALATION) EVERY 6 HOURS PRN
Qty: 18 G | Refills: 3 | Status: SHIPPED | OUTPATIENT
Start: 2023-03-06

## 2023-03-08 DIAGNOSIS — B37.31 VAGINAL YEAST INFECTION: ICD-10-CM

## 2023-03-08 DIAGNOSIS — Z30.45 CONTRACEPTIVE PATCH STATUS: ICD-10-CM

## 2023-03-09 RX ORDER — FLUCONAZOLE 150 MG/1
150 TABLET ORAL DAILY
Qty: 3 TABLET | Refills: 5 | Status: SHIPPED | OUTPATIENT
Start: 2023-03-09

## 2023-03-09 RX ORDER — NORELGESTROMIN AND ETHINYL ESTRADIOL 150; 35 UG/D; UG/D
1 PATCH TRANSDERMAL WEEKLY
Qty: 9 PATCH | Refills: 3 | Status: SHIPPED | OUTPATIENT
Start: 2023-03-09

## 2023-05-01 RX ORDER — FLUCONAZOLE 150 MG/1
150 TABLET ORAL ONCE
Qty: 1 TABLET | Refills: 1 | Status: SHIPPED | OUTPATIENT
Start: 2023-05-01 | End: 2023-05-01

## 2023-05-31 ENCOUNTER — PATIENT MESSAGE (OUTPATIENT)
Dept: FAMILY MEDICINE CLINIC | Facility: CLINIC | Age: 23
End: 2023-05-31
Payer: COMMERCIAL

## 2023-05-31 DIAGNOSIS — Z11.1 SCREENING-PULMONARY TB: Primary | ICD-10-CM

## 2023-06-07 ENCOUNTER — TELEPHONE (OUTPATIENT)
Dept: FAMILY MEDICINE CLINIC | Facility: CLINIC | Age: 23
End: 2023-06-07

## 2023-06-07 NOTE — TELEPHONE ENCOUNTER
From: Norberto Zhong  To: Aileen Colorado  Sent: 5/31/2023 3:18 PM CDT  Subject: Chest X-Ray    Hi Mrs. Gallagher,     I need a chest X-Ray done here in [place] for a TB test for one of the jobs I was offered. Do you think you could send one in for me so I can get that taken care of by tomorrow?     Thank you,     Norberto Zhong

## 2023-06-07 NOTE — TELEPHONE ENCOUNTER
Patient would like information to get a referral for a chest xray for a TB skin test in [place]. She would like a call back [phone]

## 2023-06-08 ENCOUNTER — HOSPITAL ENCOUNTER (OUTPATIENT)
Dept: GENERAL RADIOLOGY | Facility: HOSPITAL | Age: 23
Discharge: HOME | End: 2023-06-08
Payer: COMMERCIAL

## 2023-06-08 DIAGNOSIS — Z11.1 TUBERCULOSIS SCREENING: ICD-10-CM

## 2023-06-08 PROCEDURE — 71046 X-RAY EXAM CHEST 2 VIEWS: CPT

## 2023-07-24 DIAGNOSIS — F41.9 ANXIETY: ICD-10-CM

## 2023-07-27 RX ORDER — BUPROPION HYDROCHLORIDE 150 MG/1
TABLET ORAL
Qty: 90 TABLET | Refills: 0 | Status: SHIPPED | OUTPATIENT
Start: 2023-07-27 | End: 2023-07-28 | Stop reason: SDUPTHER

## 2023-07-28 ENCOUNTER — OFFICE VISIT (OUTPATIENT)
Dept: FAMILY MEDICINE CLINIC | Facility: CLINIC | Age: 23
End: 2023-07-28
Payer: COMMERCIAL

## 2023-07-28 VITALS
DIASTOLIC BLOOD PRESSURE: 70 MMHG | TEMPERATURE: 98.6 F | WEIGHT: 293 LBS | OXYGEN SATURATION: 97 % | RESPIRATION RATE: 20 BRPM | HEART RATE: 82 BPM | SYSTOLIC BLOOD PRESSURE: 120 MMHG | BODY MASS INDEX: 45.99 KG/M2 | HEIGHT: 67 IN

## 2023-07-28 DIAGNOSIS — Z13.29 SCREENING FOR THYROID DISORDER: ICD-10-CM

## 2023-07-28 DIAGNOSIS — Z00.00 WELL ADULT EXAM: Primary | ICD-10-CM

## 2023-07-28 DIAGNOSIS — Z30.45 CONTRACEPTIVE PATCH STATUS: Chronic | ICD-10-CM

## 2023-07-28 DIAGNOSIS — E55.9 VITAMIN D DEFICIENCY: Chronic | ICD-10-CM

## 2023-07-28 DIAGNOSIS — R09.81 SINUS CONGESTION: ICD-10-CM

## 2023-07-28 DIAGNOSIS — E66.01 MORBID OBESITY WITH BMI OF 45.0-49.9, ADULT: Chronic | ICD-10-CM

## 2023-07-28 DIAGNOSIS — Z13.220 SCREENING FOR LIPOID DISORDERS: ICD-10-CM

## 2023-07-28 DIAGNOSIS — R60.0 LOWER EXTREMITY EDEMA: ICD-10-CM

## 2023-07-28 DIAGNOSIS — G43.009 MIGRAINE WITHOUT AURA AND WITHOUT STATUS MIGRAINOSUS, NOT INTRACTABLE: Chronic | ICD-10-CM

## 2023-07-28 DIAGNOSIS — J30.2 SEASONAL ALLERGIES: Chronic | ICD-10-CM

## 2023-07-28 DIAGNOSIS — Z13.1 SCREENING FOR DIABETES MELLITUS: ICD-10-CM

## 2023-07-28 DIAGNOSIS — F41.9 ANXIETY: Chronic | ICD-10-CM

## 2023-07-28 PROCEDURE — 99395 PREV VISIT EST AGE 18-39: CPT | Performed by: NURSE PRACTITIONER

## 2023-07-28 RX ORDER — RIZATRIPTAN BENZOATE 5 MG/1
5-10 TABLET ORAL ONCE AS NEEDED
Qty: 18 TABLET | Refills: 11 | Status: SHIPPED | OUTPATIENT
Start: 2023-07-28

## 2023-07-28 RX ORDER — NORELGESTROMIN AND ETHINYL ESTRADIOL 150; 35 UG/D; UG/D
1 PATCH TRANSDERMAL WEEKLY
Qty: 9 PATCH | Refills: 3 | Status: SHIPPED | OUTPATIENT
Start: 2023-07-28

## 2023-07-28 RX ORDER — POTASSIUM CHLORIDE 20 MEQ/1
20 TABLET, EXTENDED RELEASE ORAL DAILY
Qty: 30 TABLET | Refills: 2 | Status: SHIPPED | OUTPATIENT
Start: 2023-07-28

## 2023-07-28 RX ORDER — BUPROPION HYDROCHLORIDE 150 MG/1
150 TABLET ORAL DAILY
Qty: 90 TABLET | Refills: 3 | Status: SHIPPED | OUTPATIENT
Start: 2023-07-28

## 2023-07-28 RX ORDER — ALBUTEROL SULFATE 90 UG/1
2 AEROSOL, METERED RESPIRATORY (INHALATION) EVERY 6 HOURS PRN
Qty: 18 G | Refills: 3 | Status: SHIPPED | OUTPATIENT
Start: 2023-07-28

## 2023-07-28 RX ORDER — FLUTICASONE PROPIONATE 50 MCG
2 SPRAY, SUSPENSION (ML) NASAL DAILY
Qty: 16 G | Refills: 11 | Status: SHIPPED | OUTPATIENT
Start: 2023-07-28

## 2023-07-28 RX ORDER — FLUCONAZOLE 150 MG/1
150 TABLET ORAL WEEKLY
Qty: 2 TABLET | Refills: 0 | Status: SHIPPED | OUTPATIENT
Start: 2023-07-28

## 2023-07-28 RX ORDER — CETIRIZINE HYDROCHLORIDE 10 MG/1
10 TABLET ORAL DAILY
Qty: 90 TABLET | Refills: 1 | Status: SHIPPED | OUTPATIENT
Start: 2023-07-28

## 2023-07-28 RX ORDER — FUROSEMIDE 40 MG/1
40 TABLET ORAL DAILY
Qty: 30 TABLET | Refills: 2 | Status: SHIPPED | OUTPATIENT
Start: 2023-07-28

## 2023-07-28 RX ORDER — AZITHROMYCIN 500 MG/1
500 TABLET, FILM COATED ORAL DAILY
Qty: 5 TABLET | Refills: 0 | Status: SHIPPED | OUTPATIENT
Start: 2023-07-28 | End: 2023-08-02

## 2023-07-28 RX ORDER — BUSPIRONE HYDROCHLORIDE 5 MG/1
5 TABLET ORAL 2 TIMES DAILY PRN
Qty: 180 TABLET | Refills: 1 | Status: SHIPPED | OUTPATIENT
Start: 2023-07-28

## 2023-07-28 NOTE — PATIENT INSTRUCTIONS
Managing Anxiety, Adult  After being diagnosed with anxiety, you may be relieved to know why you have felt or behaved a certain way. You may also feel overwhelmed about the treatment ahead and what it will mean for your life. With care and support, you can manage this condition.  How to manage lifestyle changes  Managing stress and anxiety    Stress is your body's reaction to life changes and events, both good and bad. Most stress will last just a few hours, but stress can be ongoing and can lead to more than just stress. Although stress can play a major role in anxiety, it is not the same as anxiety. Stress is usually caused by something external, such as a deadline, test, or competition. Stress normally passes after the triggering event has ended.   Anxiety is caused by something internal, such as imagining a terrible outcome or worrying that something will go wrong that will devastate you. Anxiety often does not go away even after the triggering event is over, and it can become long-term (chronic) worry. It is important to understand the differences between stress and anxiety and to manage your stress effectively so that it does not lead to an anxious response.  Talk with your health care provider or a counselor to learn more about reducing anxiety and stress. He or she may suggest tension reduction techniques, such as:  Music therapy. Spend time creating or listening to music that you enjoy and that inspires you.  Mindfulness-based meditation. Practice being aware of your normal breaths while not trying to control your breathing. It can be done while sitting or walking.  Centering prayer. This involves focusing on a word, phrase, or sacred image that means something to you and brings you peace.  Deep breathing. To do this, expand your stomach and inhale slowly through your nose. Hold your breath for 3-5 seconds. Then exhale slowly, letting your stomach muscles relax.  Self-talk. Learn to notice and identify  thought patterns that lead to anxiety reactions and change those patterns to thoughts that feel peaceful.  Muscle relaxation. Taking time to tense muscles and then relax them.  Choose a tension reduction technique that fits your lifestyle and personality. These techniques take time and practice. Set aside 5-15 minutes a day to do them. Therapists can offer counseling and training in these techniques. The training to help with anxiety may be covered by some insurance plans.  Other things you can do to manage stress and anxiety include:  Keeping a stress diary. This can help you learn what triggers your reaction and then learn ways to manage your response.  Thinking about how you react to certain situations. You may not be able to control everything, but you can control your response.  Making time for activities that help you relax and not feeling guilty about spending your time in this way.  Doing visual imagery. This involves imagining or creating mental pictures to help you relax.  Practicing yoga. Through yoga poses, you can lower tension and promote relaxation.    Medicines  Medicines can help ease symptoms. Medicines for anxiety include:  Antidepressant medicines. These are usually prescribed for long-term daily control.  Anti-anxiety medicines. These may be added in severe cases, especially when panic attacks occur.  Medicines will be prescribed by a health care provider. When used together, medicines, psychotherapy, and tension reduction techniques may be the most effective treatment.  Relationships  Relationships can play a big part in helping you recover. Try to spend more time connecting with trusted friends and family members.  Consider going to couples counseling if you have a partner, taking family education classes, or going to family therapy.  Therapy can help you and others better understand your condition.  How to recognize changes in your anxiety  Everyone responds differently to treatment for  anxiety. Recovery from anxiety happens when symptoms decrease and stop interfering with your daily activities at home or work. This may mean that you will start to:  Have better concentration and focus. Worry will interfere less in your daily thinking.  Sleep better.  Be less irritable.  Have more energy.  Have improved memory.  It is also important to recognize when your condition is getting worse. Contact your health care provider if your symptoms interfere with home or work and you feel like your condition is not improving.  Follow these instructions at home:  Activity  Exercise. Adults should do the following:  Exercise for at least 150 minutes each week. The exercise should increase your heart rate and make you sweat (moderate-intensity exercise).  Strengthening exercises at least twice a week.  Get the right amount and quality of sleep. Most adults need 7-9 hours of sleep each night.  Lifestyle    Eat a healthy diet that includes plenty of vegetables, fruits, whole grains, low-fat dairy products, and lean protein.  Do not eat a lot of foods that are high in fats, added sugars, or salt (sodium).  Make choices that simplify your life.  Do not use any products that contain nicotine or tobacco. These products include cigarettes, chewing tobacco, and vaping devices, such as e-cigarettes. If you need help quitting, ask your health care provider.  Avoid caffeine, alcohol, and certain over-the-counter cold medicines. These may make you feel worse. Ask your pharmacist which medicines to avoid.  General instructions  Take over-the-counter and prescription medicines only as told by your health care provider.  Keep all follow-up visits. This is important.  Where to find support  You can get help and support from these sources:  Self-help groups.  Online and community organizations.  A trusted spiritual leader.  Couples counseling.  Family education classes.  Family therapy.  Where to find more information  You may find  that joining a support group helps you deal with your anxiety. The following sources can help you locate counselors or support groups near you:  Mental Health [place]: www.mentalhealth[place].net  Anxiety and Depression Association of [place] (ADAA): www.adaa.org  National [place] on Mental Illness (SANDOR): www.sandor.org  Contact a health care provider if:  You have a hard time staying focused or finishing daily tasks.  You spend many hours a day feeling worried about everyday life.  You become exhausted by worry.  You start to have headaches or frequently feel tense.  You develop chronic nausea or diarrhea.  Get help right away if:  You have a racing heart and shortness of breath.  You have thoughts of hurting yourself or others.  If you ever feel like you may hurt yourself or others, or have thoughts about taking your own life, get help right away. Go to your nearest emergency department or:  Call your local emergency services (911 in the [place]  Call a suicide crisis helpline, such as the National Suicide Prevention Lifeline at 1-805.728.7299 or 609 in the [place] This is open 24 hours a day in the [place]  Text the Crisis Text Line at 577296 (in the [place]  Summary  Taking steps to learn and use tension reduction techniques can help calm you and help prevent triggering an anxiety reaction.  When used together, medicines, psychotherapy, and tension reduction techniques may be the most effective treatment.  Family, friends, and partners can play a big part in supporting you.  This information is not intended to replace advice given to you by your health care provider. Make sure you discuss any questions you have with your health care provider.  Document Revised: 07/13/2022 Document Reviewed: 04/10/2022  Elsevier Patient Education © 2023 Elsevier Inc.

## 2023-07-28 NOTE — PROGRESS NOTES
Subjective   Jenn Zhong is a 22 y.o. female.     History of Present Illness  She presents today for her annual wellness visit and routine follow-up on chronic medical problems.  She reports that she has been doing fairly well since her last office visit.  She continues to struggle with anxiety symptoms.  She reports that the Wellbutrin does help with her symptoms, however, she would like to discuss something additional to help if possible.  Her lower extremity swelling is managed with as needed Lasix and potassium.  She is doing well on her contraceptive patch.  She has established care with an OB/GYN in [place].  Her seasonal allergy symptoms continue to wax and wane.  She is currently using Zyrtec and Flonase for this.  She does need refills on all her routine daily medications.  She does make note that she will be establishing care with a PCP in [place].  She is otherwise without any other new complaints today in the office.     The following portions of the patient's history were reviewed and updated as appropriate: allergies, current medications, past family history, past medical history, past social history, past surgical history and problem list.    Review of Systems   Constitutional: Negative.    HENT:  Positive for congestion, postnasal drip, rhinorrhea, sinus pressure and sneezing.    Eyes: Negative.    Respiratory:  Positive for cough.    Cardiovascular: Negative.    Gastrointestinal: Negative.    Musculoskeletal: Negative.    Skin: Negative.    Allergic/Immunologic: Negative.    Neurological: Negative.    Hematological: Negative.    Psychiatric/Behavioral: Negative.       Objective   Physical Exam  Vitals reviewed.   Constitutional:       General: She is not in acute distress.     Appearance: She is well-developed. She is morbidly obese. She is not diaphoretic.   HENT:      Head: Normocephalic.      Right Ear: External ear normal. A middle ear effusion is present.      Left Ear:  External ear normal. A middle ear effusion is present.      Nose: Nose normal. Congestion and rhinorrhea present.   Eyes:      Pupils: Pupils are equal, round, and reactive to light.   Neck:      Thyroid: No thyromegaly.      Vascular: No JVD.   Cardiovascular:      Rate and Rhythm: Normal rate and regular rhythm.      Heart sounds: No murmur heard.    No friction rub. No gallop.   Pulmonary:      Effort: Pulmonary effort is normal. No respiratory distress.      Breath sounds: Normal breath sounds. No wheezing or rales.   Musculoskeletal:         General: Normal range of motion.      Cervical back: Normal range of motion and neck supple.   Skin:     General: Skin is warm and dry.      Coloration: Skin is not pale.      Findings: No erythema or rash.   Neurological:      Mental Status: She is alert and oriented to person, place, and time.   Psychiatric:         Behavior: Behavior normal.         Thought Content: Thought content normal.         Judgment: Judgment normal.         Assessment & Plan   Diagnoses and all orders for this visit:    1. Well adult exam (Primary)  -     CBC & Differential  -     Comprehensive Metabolic Panel  -     Hemoglobin A1c  -     Lipid Panel  -     TSH  -     Vitamin D,25-Hydroxy    2. Screening for diabetes mellitus  -     CBC & Differential  -     Comprehensive Metabolic Panel  -     Hemoglobin A1c  -     Lipid Panel  -     TSH  -     Vitamin D,25-Hydroxy    3. Screening for lipoid disorders  -     CBC & Differential  -     Comprehensive Metabolic Panel  -     Hemoglobin A1c  -     Lipid Panel  -     TSH  -     Vitamin D,25-Hydroxy    4. Screening for thyroid disorder  -     CBC & Differential  -     Comprehensive Metabolic Panel  -     Hemoglobin A1c  -     Lipid Panel  -     TSH  -     Vitamin D,25-Hydroxy    5. Morbid obesity with BMI of 45.0-49.9, adult  Comments:  BMI 46%  Orders:  -     CBC & Differential  -     Comprehensive Metabolic Panel  -     Hemoglobin A1c  -     Lipid  Panel  -     TSH  -     Vitamin D,25-Hydroxy    6. Vitamin D deficiency  -     CBC & Differential  -     Comprehensive Metabolic Panel  -     Hemoglobin A1c  -     Lipid Panel  -     TSH  -     Vitamin D,25-Hydroxy    7. Seasonal allergies  -     cetirizine (zyrTEC) 10 MG tablet; Take 1 tablet by mouth Daily.  Dispense: 90 tablet; Refill: 1  -     fluticasone (Flonase) 50 MCG/ACT nasal spray; 2 sprays into the nostril(s) as directed by provider Daily.  Dispense: 16 g; Refill: 11  -     CBC & Differential  -     Comprehensive Metabolic Panel  -     Hemoglobin A1c  -     Lipid Panel  -     TSH  -     Vitamin D,25-Hydroxy    8. Anxiety  -     buPROPion XL (WELLBUTRIN XL) 150 MG 24 hr tablet; Take 1 tablet by mouth Daily.  Dispense: 90 tablet; Refill: 3  -     CBC & Differential  -     Comprehensive Metabolic Panel  -     Hemoglobin A1c  -     Lipid Panel  -     TSH  -     Vitamin D,25-Hydroxy  -     busPIRone (BUSPAR) 5 MG tablet; Take 1 tablet by mouth 2 (Two) Times a Day As Needed (anxiety).  Dispense: 180 tablet; Refill: 1    9. Lower extremity edema  -     furosemide (Lasix) 40 MG tablet; Take 1 tablet by mouth Daily.  Dispense: 30 tablet; Refill: 2  -     potassium chloride (K-DUR,KLOR-CON) 20 MEQ CR tablet; Take 1 tablet by mouth Daily.  Dispense: 30 tablet; Refill: 2    10. Migraine without aura and without status migrainosus, not intractable  -     rizatriptan (Maxalt) 5 MG tablet; Take 1-2 tablets by mouth 1 (One) Time As Needed for Migraine. May repeat in 2 hours if needed  Dispense: 18 tablet; Refill: 11    11. Contraceptive patch status  -     Xulane 150-35 MCG/24HR; Place 1 patch on the skin as directed by provider 1 (One) Time Per Week.  Dispense: 9 patch; Refill: 3    12. Sinus congestion  -     azithromycin (Zithromax) 500 MG tablet; Take 1 tablet by mouth Daily for 5 days.  Dispense: 5 tablet; Refill: 0    Other orders  -     albuterol sulfate  (90 Base) MCG/ACT inhaler; Inhale 2 puffs Every 6  (Six) Hours As Needed for Wheezing. 2 puffs every 4-6 hours PRN cough/SOA  Dispense: 18 g; Refill: 3  -     fluconazole (Diflucan) 150 MG tablet; Take 1 tablet by mouth 1 (One) Time Per Week.  Dispense: 2 tablet; Refill: 0               Class 3 Severe Obesity (BMI >=40). Obesity-related health conditions include the following: none. Obesity is worsening. BMI is is above average; BMI management plan is completed. We discussed portion control and increasing exercise.    Anticipatory guidance provided at discharge.  Plenty of fluids.  Finish all antibiotics.  Diflucan as needed for yeast symptoms.  Fasting labs.  Continue current medications.  Follow up as scheduled for routine follow up.  Follow up sooner for problems/concerns.  Patient verbalized understanding and agreement with plan of care.        This document has been electronically signed by Aileen Colorado DNP, CINDY on July 28, 2023 11:14 CDT

## 2023-08-02 ENCOUNTER — PRIOR AUTHORIZATION (OUTPATIENT)
Dept: FAMILY MEDICINE CLINIC | Facility: CLINIC | Age: 23
End: 2023-08-02
Payer: COMMERCIAL

## 2023-08-14 ENCOUNTER — LAB (OUTPATIENT)
Dept: LAB | Facility: HOSPITAL | Age: 23
End: 2023-08-14
Payer: COMMERCIAL

## 2023-08-14 DIAGNOSIS — F41.9 ANXIETY: Chronic | ICD-10-CM

## 2023-08-14 LAB
25(OH)D3 SERPL-MCNC: 21.2 NG/ML (ref 30–100)
ALBUMIN SERPL-MCNC: 4.2 G/DL (ref 3.5–5.2)
ALBUMIN/GLOB SERPL: 1.4 G/DL
ALP SERPL-CCNC: 61 U/L (ref 39–117)
ALT SERPL W P-5'-P-CCNC: 8 U/L (ref 1–33)
ANION GAP SERPL CALCULATED.3IONS-SCNC: 8 MMOL/L (ref 5–15)
AST SERPL-CCNC: 8 U/L (ref 1–32)
BASOPHILS # BLD AUTO: 0.02 10*3/MM3 (ref 0–0.2)
BASOPHILS NFR BLD AUTO: 0.2 % (ref 0–1.5)
BILIRUB SERPL-MCNC: 0.3 MG/DL (ref 0–1.2)
BUN SERPL-MCNC: 9 MG/DL (ref 6–20)
BUN/CREAT SERPL: 12.2 (ref 7–25)
CALCIUM SPEC-SCNC: 9.8 MG/DL (ref 8.6–10.5)
CHLORIDE SERPL-SCNC: 107 MMOL/L (ref 98–107)
CHOLEST SERPL-MCNC: 199 MG/DL (ref 0–200)
CO2 SERPL-SCNC: 26 MMOL/L (ref 22–29)
CREAT SERPL-MCNC: 0.74 MG/DL (ref 0.57–1)
DEPRECATED RDW RBC AUTO: 39.2 FL (ref 37–54)
EGFRCR SERPLBLD CKD-EPI 2021: 117.5 ML/MIN/1.73
EOSINOPHIL # BLD AUTO: 0.05 10*3/MM3 (ref 0–0.4)
EOSINOPHIL NFR BLD AUTO: 0.5 % (ref 0.3–6.2)
ERYTHROCYTE [DISTWIDTH] IN BLOOD BY AUTOMATED COUNT: 12.5 % (ref 12.3–15.4)
GLOBULIN UR ELPH-MCNC: 3 GM/DL
GLUCOSE SERPL-MCNC: 88 MG/DL (ref 65–99)
HBA1C MFR BLD: 4.9 % (ref 4.8–5.6)
HCT VFR BLD AUTO: 38.6 % (ref 34–46.6)
HDLC SERPL-MCNC: 70 MG/DL (ref 40–60)
HGB BLD-MCNC: 12.4 G/DL (ref 12–15.9)
IMM GRANULOCYTES # BLD AUTO: 0.02 10*3/MM3 (ref 0–0.05)
IMM GRANULOCYTES NFR BLD AUTO: 0.2 % (ref 0–0.5)
LDLC SERPL CALC-MCNC: 107 MG/DL (ref 0–100)
LDLC/HDLC SERPL: 1.49 {RATIO}
LYMPHOCYTES # BLD AUTO: 3.24 10*3/MM3 (ref 0.7–3.1)
LYMPHOCYTES NFR BLD AUTO: 33.6 % (ref 19.6–45.3)
MCH RBC QN AUTO: 27.6 PG (ref 26.6–33)
MCHC RBC AUTO-ENTMCNC: 32.1 G/DL (ref 31.5–35.7)
MCV RBC AUTO: 85.8 FL (ref 79–97)
MONOCYTES # BLD AUTO: 0.57 10*3/MM3 (ref 0.1–0.9)
MONOCYTES NFR BLD AUTO: 5.9 % (ref 5–12)
NEUTROPHILS NFR BLD AUTO: 5.74 10*3/MM3 (ref 1.7–7)
NEUTROPHILS NFR BLD AUTO: 59.6 % (ref 42.7–76)
NRBC BLD AUTO-RTO: 0 /100 WBC (ref 0–0.2)
PLATELET # BLD AUTO: 319 10*3/MM3 (ref 140–450)
PMV BLD AUTO: 10.7 FL (ref 6–12)
POTASSIUM SERPL-SCNC: 4.1 MMOL/L (ref 3.5–5.2)
PROT SERPL-MCNC: 7.2 G/DL (ref 6–8.5)
RBC # BLD AUTO: 4.5 10*6/MM3 (ref 3.77–5.28)
SODIUM SERPL-SCNC: 141 MMOL/L (ref 136–145)
TRIGL SERPL-MCNC: 124 MG/DL (ref 0–150)
TSH SERPL DL<=0.05 MIU/L-ACNC: 2.4 UIU/ML (ref 0.27–4.2)
VLDLC SERPL-MCNC: 22 MG/DL (ref 5–40)
WBC NRBC COR # BLD: 9.64 10*3/MM3 (ref 3.4–10.8)

## 2023-08-14 PROCEDURE — 80050 GENERAL HEALTH PANEL: CPT | Performed by: NURSE PRACTITIONER

## 2023-08-14 PROCEDURE — 80061 LIPID PANEL: CPT | Performed by: NURSE PRACTITIONER

## 2023-08-14 PROCEDURE — 82306 VITAMIN D 25 HYDROXY: CPT | Performed by: NURSE PRACTITIONER

## 2023-08-14 PROCEDURE — 83036 HEMOGLOBIN GLYCOSYLATED A1C: CPT | Performed by: NURSE PRACTITIONER

## 2023-08-14 RX ORDER — BUSPIRONE HYDROCHLORIDE 5 MG/1
5 TABLET ORAL 2 TIMES DAILY PRN
Qty: 180 TABLET | Refills: 1 | OUTPATIENT
Start: 2023-08-14

## 2023-08-14 NOTE — TELEPHONE ENCOUNTER
Pt accidentally threw away bottle for anxiety medication and would like to know if pcp could send in another refill. Pt would like a call back [phone]

## 2023-08-15 DIAGNOSIS — F41.9 ANXIETY: Chronic | ICD-10-CM

## 2023-08-15 RX ORDER — BUSPIRONE HYDROCHLORIDE 5 MG/1
5 TABLET ORAL 2 TIMES DAILY PRN
Qty: 180 TABLET | Refills: 1 | OUTPATIENT
Start: 2023-08-15

## 2023-08-17 ENCOUNTER — TELEPHONE (OUTPATIENT)
Dept: FAMILY MEDICINE CLINIC | Facility: CLINIC | Age: 23
End: 2023-08-17
Payer: COMMERCIAL

## 2023-08-17 NOTE — TELEPHONE ENCOUNTER
----- Message from Aileen Colorado, TAMMIE, APRN sent at 8/15/2023  3:10 PM CDT -----  Vitamin D is low.  Recommend over-the-counter vitamin D3 5000 IU once daily.  LDL cholesterol slightly elevated.  Recommend avoiding greasy/fatty/fried foods and high fructose corn syrup.  Blood counts are okay.  Normal thyroid.  Normal electrolytes, kidney function, liver function.  Normal A1c.

## 2023-09-22 DIAGNOSIS — F41.9 ANXIETY: Chronic | ICD-10-CM

## 2023-09-22 RX ORDER — BUSPIRONE HYDROCHLORIDE 5 MG/1
5 TABLET ORAL 2 TIMES DAILY PRN
Qty: 60 TABLET | Refills: 0 | Status: SHIPPED | OUTPATIENT
Start: 2023-09-22

## 2023-09-24 DIAGNOSIS — Z30.45 CONTRACEPTIVE PATCH STATUS: Chronic | ICD-10-CM

## 2023-09-29 RX ORDER — NORELGESTROMIN AND ETHINYL ESTRADIOL 150; 35 UG/D; UG/D
1 PATCH TRANSDERMAL WEEKLY
Qty: 9 PATCH | Refills: 3 | OUTPATIENT
Start: 2023-09-29

## 2023-10-05 ENCOUNTER — OFFICE VISIT (OUTPATIENT)
Dept: FAMILY MEDICINE CLINIC | Facility: CLINIC | Age: 23
End: 2023-10-05
Payer: COMMERCIAL

## 2023-10-05 VITALS
BODY MASS INDEX: 45.99 KG/M2 | WEIGHT: 293 LBS | OXYGEN SATURATION: 100 % | SYSTOLIC BLOOD PRESSURE: 120 MMHG | TEMPERATURE: 97.8 F | DIASTOLIC BLOOD PRESSURE: 70 MMHG | HEIGHT: 67 IN | HEART RATE: 83 BPM

## 2023-10-05 DIAGNOSIS — R63.5 WEIGHT GAIN: ICD-10-CM

## 2023-10-05 DIAGNOSIS — E55.9 VITAMIN D DEFICIENCY: ICD-10-CM

## 2023-10-05 DIAGNOSIS — Z76.89 ENCOUNTER TO ESTABLISH CARE: ICD-10-CM

## 2023-10-05 DIAGNOSIS — F41.9 ANXIETY: Primary | ICD-10-CM

## 2023-10-05 DIAGNOSIS — Z30.45 CONTRACEPTIVE PATCH STATUS: ICD-10-CM

## 2023-10-05 DIAGNOSIS — Z83.3 FAMILY HISTORY OF DIABETES MELLITUS: ICD-10-CM

## 2023-10-05 DIAGNOSIS — J30.2 SEASONAL ALLERGIES: ICD-10-CM

## 2023-10-05 RX ORDER — IBUPROFEN 800 MG/1
TABLET ORAL
COMMUNITY
Start: 2023-09-06

## 2023-10-05 RX ORDER — ERGOCALCIFEROL 1.25 MG/1
50000 CAPSULE ORAL WEEKLY
Qty: 12 CAPSULE | Refills: 0 | Status: SHIPPED | OUTPATIENT
Start: 2023-10-05

## 2023-10-05 NOTE — PROGRESS NOTES
"Chief Complaint  Establish [facility] (Pt is here today to [facility], pt presents,)    Subjective        Jenn Zhong presents to [facility]    History of Present Illness  23 year old female presents to establish care. Pt is new to me and this clinic. Was previously seen by Aileen Colorado, TAMMIE, APRN. Pt has history of anxiety that is currently being managed with buspar 5mg. States it is working well for her. Wellbutrin made her feel jittery and overwhelmed. Pt currently takes zyrtec and flonase for her seasonal allergies. She is established with Ob/GYN at [facility]. She currently uses Xulane transdermal patch for birth control. Labs drawn with prior PCP on 8/14/23. Vitamin D level low at 21.2. States she is not currently taking any supplementation. Vit D supplement sent to pharmacy for pt to take once weekly. Pt states her weight has been fluctuating over the past two years and recently had COVID in September. States she drinks mainly water and knows to have diet high in protein. Wanted to discuss more nutrition education. Informed pt to have lean proteins such as chicken and fish. Have diet high in whole grains, fiber, fruits, and vegetables. Limit fried foods, red meats, and sugar. Pt should exercise 3-4 days/week. Pt has family history of diabetes with her last Hemoglobin A1c 8/14/23 4.90. Nutrition information attached to AVS and referral to nutrition services made.     Objective   Vital Signs:  /70   Pulse 83   Temp 97.8 °F (36.6 °C)   Ht 170.2 cm (67\")   Wt (!) 139 kg (305 lb 12.8 oz)   SpO2 100%   BMI 47.90 kg/m²   Estimated body mass index is 47.9 kg/m² as calculated from the following:    Height as of this encounter: 170.2 cm (67\").    Weight as of this encounter: 139 kg (305 lb 12.8 oz).       Physical Exam  Constitutional:       Appearance: Normal appearance.   HENT:      Head: Normocephalic.   Eyes:      Conjunctiva/sclera: Conjunctivae " normal.      Pupils: Pupils are equal, round, and reactive to light.   Cardiovascular:      Rate and Rhythm: Normal rate and regular rhythm.      Pulses: Normal pulses.      Heart sounds: Normal heart sounds.   Pulmonary:      Effort: Pulmonary effort is normal.      Breath sounds: Normal breath sounds.   Skin:     General: Skin is warm.   Neurological:      General: No focal deficit present.      Mental Status: She is alert and oriented to person, place, and time.   Psychiatric:         Mood and Affect: Mood normal.         Behavior: Behavior normal.          Result Review :  The following data was reviewed by: CINDY Miles on 10/05/2023:  Common labs          8/14/2023    13:16   Common Labs   Glucose 88    BUN 9    Creatinine 0.74    Sodium 141    Potassium 4.1    Chloride 107    Calcium 9.8    Albumin 4.2    Total Bilirubin 0.3    Alkaline Phosphatase 61    AST (SGOT) 8    ALT (SGPT) 8    WBC 9.64    Hemoglobin 12.4    Hematocrit 38.6    Platelets 319    Total Cholesterol 199    Triglycerides 124    HDL Cholesterol 70    LDL Cholesterol  107    Hemoglobin A1C 4.90      Data reviewed : Consultant notes prior PCP notes and labs             Assessment and Plan   Diagnoses and all orders for this visit:    1. Anxiety (Primary)  Comments:  Continue buspar    2. Seasonal allergies  Comments:  Continue zyrtec and flonase as needed    3. Vitamin D deficiency  -     vitamin D (ERGOCALCIFEROL) 1.25 MG (00558 UT) capsule capsule; Take 1 capsule by mouth 1 (One) Time Per Week.  Dispense: 12 capsule; Refill: 0    4. Encounter to establish care    5. Body mass index (BMI) of 40.0 to 44.9 in adult  -     Ambulatory Referral to Nutrition Services    6. Weight gain  -     Ambulatory Referral to Nutrition Services    7. Contraceptive patch status  Comments:  Xulane managed by Ob/GYN at [facility]    8. Family history of diabetes mellitus  -     Ambulatory Referral to Nutrition Services             Follow Up   Return in  about 6 months (around 4/5/2024) for Annual physical.  Patient was given instructions and counseling regarding her condition or for health maintenance advice. Please see specific information pulled into the AVS if appropriate.

## 2023-10-06 ENCOUNTER — PATIENT ROUNDING (BHMG ONLY) (OUTPATIENT)
Dept: FAMILY MEDICINE CLINIC | Facility: CLINIC | Age: 23
End: 2023-10-06
Payer: COMMERCIAL

## 2024-02-26 ENCOUNTER — OFFICE VISIT (OUTPATIENT)
Dept: FAMILY MEDICINE CLINIC | Facility: CLINIC | Age: 24
End: 2024-02-26
Payer: COMMERCIAL

## 2024-02-26 VITALS
HEART RATE: 77 BPM | BODY MASS INDEX: 45.99 KG/M2 | WEIGHT: 293 LBS | TEMPERATURE: 98 F | HEIGHT: 67 IN | DIASTOLIC BLOOD PRESSURE: 82 MMHG | OXYGEN SATURATION: 97 % | SYSTOLIC BLOOD PRESSURE: 124 MMHG

## 2024-02-26 DIAGNOSIS — H10.13 ALLERGIC CONJUNCTIVITIS OF BOTH EYES: ICD-10-CM

## 2024-02-26 DIAGNOSIS — Z13.1 SCREENING FOR DIABETES MELLITUS: ICD-10-CM

## 2024-02-26 DIAGNOSIS — J30.2 SEASONAL ALLERGIES: Primary | ICD-10-CM

## 2024-02-26 PROCEDURE — 99213 OFFICE O/P EST LOW 20 MIN: CPT

## 2024-02-26 RX ORDER — AZITHROMYCIN MONOHYDRATE 10 MG/ML
1 SOLUTION/ DROPS OPHTHALMIC 2 TIMES DAILY
Qty: 3.5 ML | Refills: 0 | Status: CANCELLED | OUTPATIENT
Start: 2024-02-26

## 2024-02-26 RX ORDER — MONTELUKAST SODIUM 10 MG/1
10 TABLET ORAL NIGHTLY
Qty: 90 TABLET | Refills: 0 | Status: SHIPPED | OUTPATIENT
Start: 2024-02-26 | End: 2024-02-26 | Stop reason: SDUPTHER

## 2024-02-26 RX ORDER — MONTELUKAST SODIUM 10 MG/1
10 TABLET ORAL NIGHTLY
Qty: 90 TABLET | Refills: 0 | Status: SHIPPED | OUTPATIENT
Start: 2024-02-26

## 2024-02-26 NOTE — PROGRESS NOTES
"Chief Complaint  Follow-up (Pt is here today pt presents eye problem, red and irritation. Pt states she noticed grey phlegm.)    Subjective        Jenn Zhong presents to [facility]    History of Present Illness  23 year old female presents for bilateral eye irritation with itching. Reports left is worse than right. She works at [email or web address] and exposed to conjunctivitis. No erythema or discharge noted. She is not currently taking allergy medication but using OTC eye drops. Reports zyrtec was expensive, Claritin did not work well for her. Educated not to touch eye, keep hands washed at all times. Return to clinic if symptoms do not improve or worsen. Reports she is struggling to lose weight with diet and exercise. She would like to recheck her hemoglobin A1c today.       Objective   Vital Signs:  /82   Pulse 77   Temp 98 °F (36.7 °C)   Ht 170.2 cm (67\")   Wt (!) 142 kg (312 lb)   SpO2 97%   BMI 48.87 kg/m²   Estimated body mass index is 48.87 kg/m² as calculated from the following:    Height as of this encounter: 170.2 cm (67\").    Weight as of this encounter: 142 kg (312 lb).           Physical Exam  Constitutional:       Appearance: Normal appearance.   HENT:      Head: Normocephalic.   Eyes:      General: Lids are normal. Vision grossly intact.         Right eye: No discharge or hordeolum.         Left eye: No discharge or hordeolum.      Conjunctiva/sclera: Conjunctivae normal.      Pupils: Pupils are equal, round, and reactive to light.   Cardiovascular:      Rate and Rhythm: Normal rate and regular rhythm.      Pulses: Normal pulses.      Heart sounds: Normal heart sounds.   Pulmonary:      Effort: Pulmonary effort is normal.      Breath sounds: Normal breath sounds.   Skin:     General: Skin is warm.   Neurological:      General: No focal deficit present.      Mental Status: She is alert and oriented to person, place, and time.   Psychiatric:         " Mood and Affect: Mood normal.         Behavior: Behavior normal.              Result Review :    The following data was reviewed by: CINDY Miles on 02/26/2024:  Common labs          8/14/2023    13:16   Common Labs   Glucose 88    BUN 9    Creatinine 0.74    Sodium 141    Potassium 4.1    Chloride 107    Calcium 9.8    Albumin 4.2    Total Bilirubin 0.3    Alkaline Phosphatase 61    AST (SGOT) 8    ALT (SGPT) 8    WBC 9.64    Hemoglobin 12.4    Hematocrit 38.6    Platelets 319    Total Cholesterol 199    Triglycerides 124    HDL Cholesterol 70    LDL Cholesterol  107    Hemoglobin A1C 4.90      Data reviewed : Consultant notes UC note 2/12/24 and labs              Assessment and Plan     Diagnoses and all orders for this visit:    1. Seasonal allergies (Primary)  -     Discontinue: montelukast (Singulair) 10 MG tablet; Take 1 tablet by mouth Every Night.  Dispense: 90 tablet; Refill: 0  -     montelukast (Singulair) 10 MG tablet; Take 1 tablet by mouth Every Night.  Dispense: 90 tablet; Refill: 0    2. Allergic conjunctivitis of both eyes  -     Discontinue: montelukast (Singulair) 10 MG tablet; Take 1 tablet by mouth Every Night.  Dispense: 90 tablet; Refill: 0  -     montelukast (Singulair) 10 MG tablet; Take 1 tablet by mouth Every Night.  Dispense: 90 tablet; Refill: 0    3. Screening for diabetes mellitus  -     Hemoglobin A1c    4. Body mass index (BMI) of 45.0 to 49.9 in adult  -     Ambulatory Referral to Nutrition Services             Follow Up     No follow-ups on file.  Patient was given instructions and counseling regarding her condition or for health maintenance advice. Please see specific information pulled into the AVS if appropriate.         Answers submitted by the patient for this visit:  Other (Submitted on 2/26/2024)  Please describe your symptoms.: Eye irritation  Have you had these symptoms before?: No  How long have you been having these symptoms?: 1-4 days  Primary Reason for Visit  (Submitted on 2/26/2024)  What is the primary reason for your visit?: Other

## 2024-02-27 LAB — HBA1C MFR BLD: 5 % (ref 4.8–5.6)

## 2024-07-30 ENCOUNTER — OFFICE VISIT (OUTPATIENT)
Dept: FAMILY MEDICINE CLINIC | Facility: CLINIC | Age: 24
End: 2024-07-30
Payer: COMMERCIAL

## 2024-07-30 VITALS
HEART RATE: 73 BPM | HEIGHT: 67 IN | DIASTOLIC BLOOD PRESSURE: 86 MMHG | BODY MASS INDEX: 45.99 KG/M2 | TEMPERATURE: 98 F | OXYGEN SATURATION: 98 % | SYSTOLIC BLOOD PRESSURE: 134 MMHG | WEIGHT: 293 LBS

## 2024-07-30 DIAGNOSIS — Z13.1 SCREENING FOR DIABETES MELLITUS: ICD-10-CM

## 2024-07-30 DIAGNOSIS — N89.8 VAGINAL DISCHARGE: ICD-10-CM

## 2024-07-30 DIAGNOSIS — E78.00 ELEVATED LDL CHOLESTEROL LEVEL: ICD-10-CM

## 2024-07-30 DIAGNOSIS — J45.998 SEASONAL ASTHMA: ICD-10-CM

## 2024-07-30 DIAGNOSIS — E55.9 VITAMIN D DEFICIENCY: ICD-10-CM

## 2024-07-30 DIAGNOSIS — R03.0 ELEVATED BLOOD PRESSURE READING: ICD-10-CM

## 2024-07-30 DIAGNOSIS — Z00.00 ENCOUNTER FOR ANNUAL PHYSICAL EXAM: Primary | ICD-10-CM

## 2024-07-30 PROCEDURE — 99395 PREV VISIT EST AGE 18-39: CPT

## 2024-07-30 PROCEDURE — 99213 OFFICE O/P EST LOW 20 MIN: CPT

## 2024-07-30 RX ORDER — FLUCONAZOLE 150 MG/1
TABLET ORAL
COMMUNITY
Start: 2024-07-11 | End: 2024-08-17

## 2024-07-30 RX ORDER — FLUTICASONE PROPIONATE AND SALMETEROL 50; 250 UG/1; UG/1
POWDER RESPIRATORY (INHALATION)
COMMUNITY
Start: 2024-07-23

## 2024-07-30 NOTE — PROGRESS NOTES
Chief Complaint  Annual Exam (Pt is here today annual exam, pt presents bv/yeast infection requesting labs)    Subjective            Jenn Zhong presents to [facility]    History of Present Illness  23 year old female presents for annual physical. She is Vitamin D deficient and has been treated previously with once weekly supplement. LDL elevated on cholesterol panel from 8/14/23. She presents with symptoms of vaginal discharge. Notices discharge with wiping, states it has mucus consistency. Her last menstrual cycle lasted longer than normal. She tested positive for yeast infection and was treated with diflucan on 7/11 at [facility]. She has diflucan to take every 7 days. She has appt with her GYN 8/23/24 for recurrent yeast and bacterial vaginosis infections. Will obtain urine specimen. She has changed her diet, taking cranberry supplements, pre and probiotics, drinking more water without relief. She is on exercise plan with . States she is following good hygiene habits after gym. She reports family history of diabetes and hypertension. Blood pressure is elevated at 134/86. She has BMI of 50.28. Pt should follow healthy diet high in lean proteins such as chicken and fish. Have diet high in whole grains, fiber, fruits, and vegetables. Limit sodium, fried foods, red meats, and sugar. Pt should exercise 3-4 days/week. Heart healthy diet information attached to AVS. States she has been increasing her exercise and following healthy diet but continues to gain weight. Interested in Bariatrics referral. Denies chest pain and shortness of breath. Denies changes in bowel or bladder habits. She states she had allergy testing previously due to recurrent hives. She has plans for allergy shots. Pulmonary function testing was abnormal, diagnosed with seasonal asthma. She is currently on Advair daily, albuterol as needed.       CPE- Patient reporting that health is  doing well overall.  Patient is here today for annual physical.  Eating a balanced diet.    Labs: Due for routine labs    Colorectal cancer screening: N/A  Breast cancer screening: N/A  Cervical cancer screening: due 7/21/25    Immunization History   Administered Date(s) Administered    COVID-19 (PFIZER) Purple Cap Monovalent 02/07/2022    DTaP 2000, 01/15/2001, 03/15/2001, 10/11/2001, 09/24/2004    DTaP, Unspecified 2000, 01/15/2001, 03/15/2001, 10/11/2001, 09/24/2004    Flu Vaccine Quad PF >36MO 10/06/2017, 10/07/2018, 10/20/2019    Flublock Quad =>18yrs 11/01/2020    Fluzone (or Fluarix & Flulaval for VFC) >6mos 08/25/2023    Fluzone Quad >6mos (Multi-dose) 12/05/2016, 11/12/2020    HPV Quadrivalent 10/14/2011, 01/05/2012, 05/24/2012    HPV, Unspecified 10/14/2011, 01/05/2012, 05/24/2012    Hep A, 2 Dose 10/14/2011, 10/06/2017    Hep B, Adolescent or Pediatric 2000, 2000, 03/15/2001, 12/10/2001    Hepatitis A 10/14/2011, 10/06/2017    Hepatitis B Adult/Adolescent IM 2000, 2000, 03/15/2001, 12/10/2001    HiB 2000, 01/15/2001, 03/15/2001, 12/10/2001    IPV 2000, 01/15/2001, 12/10/2001, 09/24/2004    Influenza TIV (IM) 12/05/2016    Influenza, Unspecified 11/30/2012, 11/03/2013, 11/26/2014, 10/26/2015, 11/23/2015    MMR 10/11/2001, 09/24/2004    Meningococcal Conjugate 10/14/2011    Meningococcal MCV4P (Menactra) 10/14/2011, 12/05/2016    PPD Test 05/08/2017    Td (TDVAX) 05/11/2016    Td, Not Adsorbed 05/11/2016    Tdap 10/14/2011    Varicella 10/11/2001, 10/26/2011      Patient has seen dentist within last 6 months  yes  Patient has seen eye specialist within last 6 months no    PHQ-2 Depression Screening  Little interest or pleasure in doing things? 0-->not at all   Feeling down, depressed, or hopeless? 0-->not at all   PHQ-2 Total Score 0      Social History     Socioeconomic History    Marital status: Single   Tobacco Use    Smoking status: Never     Passive  "exposure: Never    Smokeless tobacco: Never   Vaping Use    Vaping status: Never Used   Substance and Sexual Activity    Alcohol use: No    Drug use: No    Sexual activity: Not Currently     Partners: Male     Birth control/protection: Condom, Patch        Objective   Vital Signs:   /86   Pulse 73   Temp 98 °F (36.7 °C)   Ht 170.2 cm (67\")   Wt (!) 146 kg (321 lb)   SpO2 98%   BMI 50.28 kg/m²         Physical Exam  Constitutional:       Appearance: Normal appearance.   HENT:      Head: Normocephalic.      Right Ear: Tympanic membrane, ear canal and external ear normal.      Left Ear: Tympanic membrane, ear canal and external ear normal.      Nose: Nose normal.      Mouth/Throat:      Mouth: Mucous membranes are moist.      Pharynx: Oropharynx is clear.   Eyes:      Conjunctiva/sclera: Conjunctivae normal.      Pupils: Pupils are equal, round, and reactive to light.   Cardiovascular:      Rate and Rhythm: Normal rate and regular rhythm.      Pulses: Normal pulses.      Heart sounds: Normal heart sounds.   Pulmonary:      Effort: Pulmonary effort is normal.      Breath sounds: Normal breath sounds.   Abdominal:      General: Abdomen is flat.   Musculoskeletal:         General: Normal range of motion.      Cervical back: Normal range of motion.   Skin:     General: Skin is warm.   Neurological:      General: No focal deficit present.      Mental Status: She is alert and oriented to person, place, and time.   Psychiatric:         Mood and Affect: Mood normal.         Behavior: Behavior normal.            Result Review :   The following data was reviewed by: CINDY Miles on 07/30/2024:  Common labs          8/14/2023    13:16 2/26/2024    11:54   Common Labs   Glucose 88     BUN 9     Creatinine 0.74     Sodium 141     Potassium 4.1     Chloride 107     Calcium 9.8     Albumin 4.2     Total Bilirubin 0.3     Alkaline Phosphatase 61     AST (SGOT) 8     ALT (SGPT) 8     WBC 9.64     Hemoglobin 12.4   "   Hematocrit 38.6     Platelets 319     Total Cholesterol 199     Triglycerides 124     HDL Cholesterol 70     LDL Cholesterol  107     Hemoglobin A1C 4.90  5.0      Data reviewed : labs            Current Outpatient Medications:     Advair Diskus 250-50 MCG/ACT DISKUS, , Disp: , Rfl:     busPIRone (BUSPAR) 5 MG tablet, Take 1 tablet by mouth 2 (Two) Times a Day As Needed (anxiety)., Disp: 60 tablet, Rfl: 0    fluconazole (DIFLUCAN) 150 MG tablet, Take  by mouth., Disp: , Rfl:     furosemide (Lasix) 40 MG tablet, Take 1 tablet by mouth Daily., Disp: 30 tablet, Rfl: 2    montelukast (Singulair) 10 MG tablet, Take 1 tablet by mouth Every Night., Disp: 90 tablet, Rfl: 0    potassium chloride (K-DUR,KLOR-CON) 20 MEQ CR tablet, Take 1 tablet by mouth Daily., Disp: 30 tablet, Rfl: 2    vitamin D (ERGOCALCIFEROL) 1.25 MG (92976 UT) capsule capsule, Take 1 capsule by mouth 1 (One) Time Per Week., Disp: 12 capsule, Rfl: 0    Xulane 150-35 MCG/24HR, Place 1 patch on the skin as directed by provider 1 (One) Time Per Week., Disp: 9 patch, Rfl: 3    albuterol sulfate  (90 Base) MCG/ACT inhaler, Inhale 2 puffs Every 6 (Six) Hours As Needed for Wheezing. 2 puffs every 4-6 hours PRN cough/SOA (Patient not taking: Reported on 7/30/2024), Disp: 18 g, Rfl: 3    buPROPion XL (WELLBUTRIN XL) 150 MG 24 hr tablet, Take 1 tablet by mouth Daily. (Patient not taking: Reported on 2/12/2024), Disp: 90 tablet, Rfl: 3    cetirizine (zyrTEC) 10 MG tablet, Take 1 tablet by mouth Daily. (Patient not taking: Reported on 2/12/2024), Disp: 90 tablet, Rfl: 1    fluticasone (Flonase) 50 MCG/ACT nasal spray, 2 sprays into the nostril(s) as directed by provider Daily. (Patient not taking: Reported on 2/26/2024), Disp: 16 g, Rfl: 11          Assessment and Plan    Diagnoses and all orders for this visit:    1. Encounter for annual physical exam (Primary)  -     CBC w AUTO Differential  -     Comprehensive metabolic panel  -     Urinalysis With  Culture If Indicated - Urine, Clean Catch    2. Elevated LDL cholesterol level  -     Lipid panel    3. Vaginal discharge  -     NuSwab BV & Candida - Swab, Vagina  -     Urinalysis With Culture If Indicated - Urine, Clean Catch    4. Elevated blood pressure reading  Comments:  Follow heart healthy diet. Limit sodium, red meats, and fried, fatty foods. Increase intake of lean proteins and whole grains. Exercise 3-4 days/week.  Orders:  -     CBC w AUTO Differential  -     Comprehensive metabolic panel    5. Vitamin D deficiency  -     Vitamin D 25 hydroxy    6. Screening for diabetes mellitus  -     Hemoglobin A1c    7. Seasonal asthma  Comments:  Continue Advair daily with albuterol as needed. Follow treatment plan with Allergist.    8. Body mass index (BMI) 50.0-59.9, adult  -     Ambulatory Referral to Bariatric Surgery          The patient was counseled regarding nutrition, physical activity, healthy weight, injury prevention, misuse of tobacco, alcohol and illicit drugs, mental health, immunizations, and screenings.      Follow Up   Return in about 1 year (around 7/31/2025) for Annual physical.  Patient was given instructions and counseling regarding her condition or for health maintenance advice. Please see specific information pulled into the AVS if appropriate.

## 2024-07-31 ENCOUNTER — PATIENT MESSAGE (OUTPATIENT)
Dept: FAMILY MEDICINE CLINIC | Facility: CLINIC | Age: 24
End: 2024-07-31
Payer: COMMERCIAL

## 2024-07-31 DIAGNOSIS — F41.9 ANXIETY: Chronic | ICD-10-CM

## 2024-07-31 LAB
25(OH)D3+25(OH)D2 SERPL-MCNC: 25 NG/ML (ref 30–100)
A VAGINAE DNA VAG QL NAA+PROBE: ABNORMAL SCORE
ALBUMIN SERPL-MCNC: 4.2 G/DL (ref 4–5)
ALP SERPL-CCNC: 74 IU/L (ref 44–121)
ALT SERPL-CCNC: 12 IU/L (ref 0–32)
APPEARANCE UR: CLEAR
AST SERPL-CCNC: 14 IU/L (ref 0–40)
BACTERIA #/AREA URNS HPF: NORMAL /[HPF]
BASOPHILS # BLD AUTO: 0 X10E3/UL (ref 0–0.2)
BASOPHILS NFR BLD AUTO: 0 %
BILIRUB SERPL-MCNC: 0.3 MG/DL (ref 0–1.2)
BILIRUB UR QL STRIP: NEGATIVE
BUN SERPL-MCNC: 11 MG/DL (ref 6–20)
BUN/CREAT SERPL: 15 (ref 9–23)
BVAB2 DNA VAG QL NAA+PROBE: ABNORMAL SCORE
C ALBICANS DNA VAG QL NAA+PROBE: POSITIVE
C GLABRATA DNA VAG QL NAA+PROBE: NEGATIVE
CALCIUM SERPL-MCNC: 9.5 MG/DL (ref 8.7–10.2)
CASTS URNS QL MICRO: NORMAL /LPF
CHLORIDE SERPL-SCNC: 102 MMOL/L (ref 96–106)
CHOLEST SERPL-MCNC: 192 MG/DL (ref 100–199)
CO2 SERPL-SCNC: 26 MMOL/L (ref 20–29)
COLOR UR: YELLOW
CREAT SERPL-MCNC: 0.73 MG/DL (ref 0.57–1)
EGFRCR SERPLBLD CKD-EPI 2021: 118 ML/MIN/1.73
EOSINOPHIL # BLD AUTO: 0.1 X10E3/UL (ref 0–0.4)
EOSINOPHIL NFR BLD AUTO: 2 %
EPI CELLS #/AREA URNS HPF: NORMAL /HPF (ref 0–10)
ERYTHROCYTE [DISTWIDTH] IN BLOOD BY AUTOMATED COUNT: 12.3 % (ref 11.7–15.4)
GLOBULIN SER CALC-MCNC: 2.8 G/DL (ref 1.5–4.5)
GLUCOSE SERPL-MCNC: 83 MG/DL (ref 70–99)
GLUCOSE UR QL STRIP: NEGATIVE
HBA1C MFR BLD: 5.4 % (ref 4.8–5.6)
HCT VFR BLD AUTO: 39.8 % (ref 34–46.6)
HDLC SERPL-MCNC: 67 MG/DL
HGB BLD-MCNC: 12.6 G/DL (ref 11.1–15.9)
HGB UR QL STRIP: NEGATIVE
IMM GRANULOCYTES # BLD AUTO: 0 X10E3/UL (ref 0–0.1)
IMM GRANULOCYTES NFR BLD AUTO: 0 %
KETONES UR QL STRIP: NEGATIVE
LDLC SERPL CALC-MCNC: 112 MG/DL (ref 0–99)
LEUKOCYTE ESTERASE UR QL STRIP: NEGATIVE
LYMPHOCYTES # BLD AUTO: 3.1 X10E3/UL (ref 0.7–3.1)
LYMPHOCYTES NFR BLD AUTO: 34 %
MCH RBC QN AUTO: 27.6 PG (ref 26.6–33)
MCHC RBC AUTO-ENTMCNC: 31.7 G/DL (ref 31.5–35.7)
MCV RBC AUTO: 87 FL (ref 79–97)
MEGA1 DNA VAG QL NAA+PROBE: ABNORMAL SCORE
MICRO URNS: NORMAL
MICRO URNS: NORMAL
MONOCYTES # BLD AUTO: 0.6 X10E3/UL (ref 0.1–0.9)
MONOCYTES NFR BLD AUTO: 7 %
NEUTROPHILS # BLD AUTO: 5.1 X10E3/UL (ref 1.4–7)
NEUTROPHILS NFR BLD AUTO: 57 %
NITRITE UR QL STRIP: NEGATIVE
PH UR STRIP: 7 [PH] (ref 5–7.5)
PLATELET # BLD AUTO: 354 X10E3/UL (ref 150–450)
POTASSIUM SERPL-SCNC: 4.3 MMOL/L (ref 3.5–5.2)
PROT SERPL-MCNC: 7 G/DL (ref 6–8.5)
PROT UR QL STRIP: NEGATIVE
RBC # BLD AUTO: 4.57 X10E6/UL (ref 3.77–5.28)
RBC #/AREA URNS HPF: NORMAL /HPF (ref 0–2)
SODIUM SERPL-SCNC: 142 MMOL/L (ref 134–144)
SP GR UR STRIP: 1.02 (ref 1–1.03)
TRIGL SERPL-MCNC: 70 MG/DL (ref 0–149)
URINALYSIS REFLEX: NORMAL
UROBILINOGEN UR STRIP-MCNC: 0.2 MG/DL (ref 0.2–1)
VLDLC SERPL CALC-MCNC: 13 MG/DL (ref 5–40)
WBC # BLD AUTO: 9 X10E3/UL (ref 3.4–10.8)
WBC #/AREA URNS HPF: NORMAL /HPF (ref 0–5)

## 2024-07-31 RX ORDER — BUSPIRONE HYDROCHLORIDE 5 MG/1
5 TABLET ORAL 2 TIMES DAILY PRN
Qty: 60 TABLET | Refills: 2 | Status: SHIPPED | OUTPATIENT
Start: 2024-07-31

## 2024-08-02 DIAGNOSIS — E78.00 ELEVATED LDL CHOLESTEROL LEVEL: ICD-10-CM

## 2024-08-02 NOTE — TELEPHONE ENCOUNTER
I have sent [employer] to begin prior authorization process. Pt will be notified once decision has been made. If approved, she will need to follow up in clinic in 4 weeks for follow up and dose increases.

## 2024-08-02 NOTE — TELEPHONE ENCOUNTER
From: Jenn Zhong  Sent: 8/2/2024 11:38 AM EDT  To: CINDY Miles  Subject: RE: Refilling Buspar    .    ----- Message -----  From: Jenn Zhong  Sent: 8/2/2024 10:47 AM EDT  To: [facility]  Subject: Refilling Buspar     Yes please

## 2024-08-07 ENCOUNTER — TELEPHONE (OUTPATIENT)
Dept: FAMILY MEDICINE CLINIC | Facility: CLINIC | Age: 24
End: 2024-08-07
Payer: COMMERCIAL

## 2024-08-07 NOTE — TELEPHONE ENCOUNTER
Caller: Jenn Zhong'REINA    Relationship: Self    Best call back number:[phone]     What was the call regarding: PATIENT IS REQUESTING A CALL BACK OR [employer] MESSAGE REGARDING IF SHE IS ELIGIBLE TO GET A MEDICAL SAVINGS CARD FOR THE ZEPBOUND MEDICATION    PLEASE CALL AND ADVISE

## 2024-08-12 ENCOUNTER — TELEPHONE (OUTPATIENT)
Dept: FAMILY MEDICINE CLINIC | Facility: CLINIC | Age: 24
End: 2024-08-12
Payer: COMMERCIAL

## 2024-08-12 NOTE — TELEPHONE ENCOUNTER
Caller: Jenn Zhong    Relationship: Self    Best call back number: [phone]      What was the call regarding: PATIENT IS CALLING TO CHECK ON A PRIOR AUTH FOR   Tirzepatide-Weight Management (ZEPBOUND) 2.5 MG/0.5ML solution auto-injector   PATIENT STATES SHE CALLED [facility] 8/10/24 THEY HAD NOT RECEIVED THE PRIOR AUTH  FOR THIS MEDICATION LISTED ABOVE

## 2024-08-13 NOTE — TELEPHONE ENCOUNTER
Patient informed PA submitted to [employer] 8/12. Determination will be made in 1-5 days per cover my meds.

## 2024-08-30 ENCOUNTER — TELEPHONE (OUTPATIENT)
Dept: FAMILY MEDICINE CLINIC | Facility: CLINIC | Age: 24
End: 2024-08-30
Payer: COMMERCIAL

## 2024-08-30 NOTE — TELEPHONE ENCOUNTER
LENA and informed her she would have to go to Occupational Medicine and have the testing done through her insurance. Patient understood.

## 2024-08-30 NOTE — TELEPHONE ENCOUNTER
Pt is requesting a TB Test and A UDS, this is for a possible job opportunity but will not be going through employer.

## 2024-10-04 ENCOUNTER — OFFICE VISIT (OUTPATIENT)
Dept: FAMILY MEDICINE CLINIC | Facility: CLINIC | Age: 24
End: 2024-10-04
Payer: COMMERCIAL

## 2024-10-04 VITALS
TEMPERATURE: 98 F | BODY MASS INDEX: 45.99 KG/M2 | SYSTOLIC BLOOD PRESSURE: 118 MMHG | HEIGHT: 67 IN | HEART RATE: 83 BPM | WEIGHT: 293 LBS | DIASTOLIC BLOOD PRESSURE: 70 MMHG | OXYGEN SATURATION: 97 %

## 2024-10-04 DIAGNOSIS — N76.0 VAGINAL INFECTION: ICD-10-CM

## 2024-10-04 DIAGNOSIS — Z11.1 SCREENING-PULMONARY TB: ICD-10-CM

## 2024-10-04 DIAGNOSIS — Z76.89 ENCOUNTER FOR WEIGHT MANAGEMENT: Primary | ICD-10-CM

## 2024-10-04 RX ORDER — CLINDAMYCIN HCL 300 MG
300 CAPSULE ORAL 2 TIMES WEEKLY
COMMUNITY
Start: 2024-09-10

## 2024-10-04 RX ORDER — ONDANSETRON 4 MG/1
4 TABLET, FILM COATED ORAL EVERY 8 HOURS PRN
Qty: 30 TABLET | Refills: 0 | Status: SHIPPED | OUTPATIENT
Start: 2024-10-04

## 2024-10-04 RX ORDER — FLUCONAZOLE 200 MG/1
1 TABLET ORAL WEEKLY
COMMUNITY
Start: 2024-09-24

## 2024-10-04 RX ORDER — NORELGESTROMIN AND ETHINYL ESTRADIOL 35; 150 UG/MG; UG/MG
1 PATCH TRANSDERMAL WEEKLY
COMMUNITY

## 2024-10-04 NOTE — PROGRESS NOTES
"Chief Complaint  Obesity (1 month f/u/)    Subjective        Jenn Zhong presents to [facility]    History of Present Illness  24 year old female presents for follow up for Zepbound. She is currently on 2.5mg weekly for weight loss. Her last dose was Friday. Weight is stable at 321 lbs with BMI of 50.31. Denies GI side effects including nausea, vomiting, diarrhea, and constipation with use of medication. She had single episode of diarrhea. Agreeable to increasing to 5mg once weekly for weight loss. Will send zofran if nausea or vomiting were to occur with dose increase. Blood pressure is well controlled at 118/70. She was put on Clindamycin and Diflucan from her OB due to recurrent vaginal infections. She has been on antibiotic therapy for many months. Reports she has not had flare since beginning medication. She is interested in switching her Ob/GYN to a [facility] provider. Referral placed. She is in need of TB screening for work due to allergy to T spot.       Objective   Vital Signs:  /70 (BP Location: Right arm, Patient Position: Sitting, Cuff Size: Large Adult)   Pulse 83   Temp 98 °F (36.7 °C) (Infrared)   Ht 170.2 cm (67\")   Wt (!) 146 kg (321 lb 3.2 oz)   SpO2 97%   BMI 50.31 kg/m²   Estimated body mass index is 50.31 kg/m² as calculated from the following:    Height as of this encounter: 170.2 cm (67\").    Weight as of this encounter: 146 kg (321 lb 3.2 oz).        Physical Exam  Constitutional:       Appearance: Normal appearance.   HENT:      Head: Normocephalic.   Eyes:      Conjunctiva/sclera: Conjunctivae normal.      Pupils: Pupils are equal, round, and reactive to light.   Cardiovascular:      Rate and Rhythm: Normal rate and regular rhythm.      Pulses: Normal pulses.      Heart sounds: Normal heart sounds.   Pulmonary:      Effort: Pulmonary effort is normal.      Breath sounds: Normal breath sounds.   Skin:     General: Skin is warm. "   Neurological:      General: No focal deficit present.      Mental Status: She is alert and oriented to person, place, and time.   Psychiatric:         Mood and Affect: Mood normal.         Behavior: Behavior normal.            Result Review :                Assessment and Plan   Diagnoses and all orders for this visit:    1. Encounter for weight management (Primary)  -     Tirzepatide-Weight Management (ZEPBOUND) 5 MG/0.5ML solution auto-injector; Inject 0.5 mL under the skin into the appropriate area as directed 1 (One) Time Per Week.  Dispense: 2 mL; Refill: 0  -     ondansetron (Zofran) 4 MG tablet; Take 1 tablet by mouth Every 8 (Eight) Hours As Needed for Nausea or Vomiting.  Dispense: 30 tablet; Refill: 0    2. Body mass index (BMI) 50.0-59.9, adult  -     Tirzepatide-Weight Management (ZEPBOUND) 5 MG/0.5ML solution auto-injector; Inject 0.5 mL under the skin into the appropriate area as directed 1 (One) Time Per Week.  Dispense: 2 mL; Refill: 0  -     ondansetron (Zofran) 4 MG tablet; Take 1 tablet by mouth Every 8 (Eight) Hours As Needed for Nausea or Vomiting.  Dispense: 30 tablet; Refill: 0    3. Vaginal infection  -     Ambulatory Referral to Obstetrics / Gynecology    4. Screening-pulmonary TB  -     XR Chest PA & Lateral; Future  -     QuantiFERON TB Gold             Follow Up   No follow-ups on file.  Patient was given instructions and counseling regarding her condition or for health maintenance advice. Please see specific information pulled into the AVS if appropriate.

## 2024-10-08 LAB
GAMMA INTERFERON BACKGROUND BLD IA-ACNC: 0 IU/ML
M TB IFN-G BLD-IMP: NEGATIVE
M TB IFN-G CD4+ BCKGRND COR BLD-ACNC: 0.02 IU/ML
M TB IFN-G CD4+CD8+ BCKGRND COR BLD-ACNC: 0.03 IU/ML
MITOGEN IGNF BCKGRD COR BLD-ACNC: >10 IU/ML
QUANTIFERON INCUBATION: NORMAL
SERVICE CMNT-IMP: NORMAL

## 2024-10-29 ENCOUNTER — OFFICE VISIT (OUTPATIENT)
Dept: FAMILY MEDICINE CLINIC | Facility: CLINIC | Age: 24
End: 2024-10-29
Payer: COMMERCIAL

## 2024-10-29 VITALS
HEART RATE: 88 BPM | RESPIRATION RATE: 16 BRPM | BODY MASS INDEX: 45.99 KG/M2 | SYSTOLIC BLOOD PRESSURE: 120 MMHG | DIASTOLIC BLOOD PRESSURE: 68 MMHG | OXYGEN SATURATION: 97 % | TEMPERATURE: 98.4 F | HEIGHT: 67 IN | WEIGHT: 293 LBS

## 2024-10-29 DIAGNOSIS — Z76.89 ENCOUNTER FOR WEIGHT MANAGEMENT: Primary | ICD-10-CM

## 2024-10-29 DIAGNOSIS — H65.03 NON-RECURRENT ACUTE SEROUS OTITIS MEDIA OF BOTH EARS: ICD-10-CM

## 2024-10-29 PROCEDURE — 99213 OFFICE O/P EST LOW 20 MIN: CPT

## 2024-10-29 RX ORDER — FLUTICASONE PROPIONATE 50 MCG
2 SPRAY, SUSPENSION (ML) NASAL DAILY
Qty: 15.8 ML | Refills: 0 | Status: SHIPPED | OUTPATIENT
Start: 2024-10-29

## 2024-10-29 NOTE — PROGRESS NOTES
"Chief Complaint  Med Management (Requesting increased dose of Zepbound.  Denies side effects to current dosage.)    Subjective        Jenn Zhong presents to [facility]    History of Present Illness  24 year old female presents for weight management. She is currently on Wegovy 5mg weekly. She is on week 4 of current dosing. She has lost 7 lbs since her previous visit. Denies abdominal pain, nausea, vomiting, diarrhea, and constipation. Has increase in belching but denies vomiting and acid reflux. Denies chest pain and shortness of breath. Informed patient to monitor weight while on new dose, report any new abdominal symptoms. Informed patient we can extend dose by 4 weeks if new symptoms occur. Pt voiced understanding. She was diagnosed with acute serous otitis bilaterally and taking daily singulair, receives allergy injections. Encouraged use of intranasal steroid.       Objective   Vital Signs:  /68 (BP Location: Left arm, Patient Position: Sitting, Cuff Size: Large Adult)   Pulse 88   Temp 98.4 °F (36.9 °C) (Temporal)   Resp 16   Ht 170.2 cm (67.01\")   Wt (!) 143 kg (314 lb 9.6 oz)   SpO2 97%   BMI 49.26 kg/m²   Estimated body mass index is 49.26 kg/m² as calculated from the following:    Height as of this encounter: 170.2 cm (67.01\").    Weight as of this encounter: 143 kg (314 lb 9.6 oz).          Physical Exam  Constitutional:       Appearance: Normal appearance.   HENT:      Head: Normocephalic.   Eyes:      Conjunctiva/sclera: Conjunctivae normal.      Pupils: Pupils are equal, round, and reactive to light.   Cardiovascular:      Rate and Rhythm: Normal rate and regular rhythm.      Pulses: Normal pulses.      Heart sounds: Normal heart sounds.   Pulmonary:      Effort: Pulmonary effort is normal.      Breath sounds: Normal breath sounds.   Skin:     General: Skin is warm.   Neurological:      General: No focal deficit present.      Mental Status: She " is alert and oriented to person, place, and time.   Psychiatric:         Mood and Affect: Mood normal.         Behavior: Behavior normal.          Result Review :                Assessment and Plan   Diagnoses and all orders for this visit:    1. Encounter for weight management (Primary)  -     Tirzepatide-Weight Management (ZEPBOUND) 7.5 MG/0.5ML solution auto-injector; Inject 0.5 mL under the skin into the appropriate area as directed 1 (One) Time Per Week.  Dispense: 2 mL; Refill: 0    2. Body mass index (BMI) 50.0-59.9, adult  -     Tirzepatide-Weight Management (ZEPBOUND) 7.5 MG/0.5ML solution auto-injector; Inject 0.5 mL under the skin into the appropriate area as directed 1 (One) Time Per Week.  Dispense: 2 mL; Refill: 0    3. Non-recurrent acute serous otitis media of both ears  -     fluticasone (FLONASE) 50 MCG/ACT nasal spray; Administer 2 sprays into the nostril(s) as directed by provider Daily.  Dispense: 15.8 mL; Refill: 0             Follow Up   Return in about 3 months (around 1/29/2025) for Next scheduled follow up.  Patient was given instructions and counseling regarding her condition or for health maintenance advice. Please see specific information pulled into the AVS if appropriate.

## 2024-11-21 DIAGNOSIS — Z76.89 ENCOUNTER FOR WEIGHT MANAGEMENT: ICD-10-CM

## 2024-11-21 DIAGNOSIS — E78.00 ELEVATED LDL CHOLESTEROL LEVEL: ICD-10-CM

## 2025-02-07 ENCOUNTER — OFFICE VISIT (OUTPATIENT)
Dept: FAMILY MEDICINE CLINIC | Facility: CLINIC | Age: 25
End: 2025-02-07
Payer: COMMERCIAL

## 2025-02-07 VITALS
HEIGHT: 67 IN | WEIGHT: 293 LBS | BODY MASS INDEX: 45.99 KG/M2 | TEMPERATURE: 97.8 F | SYSTOLIC BLOOD PRESSURE: 118 MMHG | HEART RATE: 88 BPM | DIASTOLIC BLOOD PRESSURE: 72 MMHG | OXYGEN SATURATION: 98 %

## 2025-02-07 DIAGNOSIS — Z76.89 ENCOUNTER FOR WEIGHT MANAGEMENT: Primary | ICD-10-CM

## 2025-02-07 DIAGNOSIS — E66.01 MORBID OBESITY WITH BMI OF 45.0-49.9, ADULT: Chronic | ICD-10-CM

## 2025-02-07 PROCEDURE — 99213 OFFICE O/P EST LOW 20 MIN: CPT

## 2025-02-07 RX ORDER — ONDANSETRON 4 MG/1
4 TABLET, FILM COATED ORAL EVERY 8 HOURS PRN
Qty: 30 TABLET | Refills: 0 | Status: SHIPPED | OUTPATIENT
Start: 2025-02-07

## 2025-02-07 NOTE — PROGRESS NOTES
"Chief Complaint  Weight Loss (Patient is here today wanting to restart Zepbound /Didn'y have any problems with Zepbound last time she was on it )    Subjective        Jenn Zhong presents to [facility]    History of Present Illness  24 year old female presents for weight loss management. She is wanting to restart Zepbound medication. Was on injection previously and titrated up to 10mg weekly. Her last injection was 1/10/25.  Has current BMI 48.19.  Blood pressure is well-controlled today at 118/72.  She experienced complication of constipation with previous of bowel medication.  Encouraged use of MiraLAX, daily fiber supplement, increase water intake, and daily exercise.  She reports she is working on exercise for weight loss benefits and has a goal weight of 215 pounds by the end of 2025.  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  Patient to report any side effects of restarting medication.  Patient to contact provider office for dose increase.    Weight Management  Weight:  Increased  Weight change (lbs):  10  Weight loss treatment:  Starting exercise program, commercial weight loss program and prescription medications  Medication side effects:  Nausea and constipation  Treatment barriers:  Insurance issues  Physical activity tolerance:  Stable  Energy level:  Decreased  Weight Loss      Objective   Vital Signs:  /72 (BP Location: Left arm, Patient Position: Sitting, Cuff Size: Adult)   Pulse 88   Temp 97.8 °F (36.6 °C) (Infrared)   Ht 170.2 cm (67.01\")   Wt (!) 140 kg (307 lb 12.8 oz)   SpO2 98%   BMI 48.19 kg/m²   Estimated body mass index is 48.19 kg/m² as calculated from the following:    Height as of this encounter: 170.2 cm (67.01\").    Weight as of this encounter: 140 kg (307 lb 12.8 oz).         Physical Exam  Constitutional:       Appearance: Normal appearance. She is obese.   HENT:      Head: Normocephalic. "   Eyes:      Conjunctiva/sclera: Conjunctivae normal.      Pupils: Pupils are equal, round, and reactive to light.   Cardiovascular:      Rate and Rhythm: Normal rate and regular rhythm.      Pulses: Normal pulses.   Pulmonary:      Effort: Pulmonary effort is normal.      Breath sounds: Normal breath sounds.   Skin:     General: Skin is warm.   Neurological:      General: No focal deficit present.      Mental Status: She is alert and oriented to person, place, and time.   Psychiatric:         Mood and Affect: Mood normal.         Behavior: Behavior normal.        Result Review :                Assessment and Plan   Diagnoses and all orders for this visit:    1. Encounter for weight management (Primary)  -     ondansetron (Zofran) 4 MG tablet; Take 1 tablet by mouth Every 8 (Eight) Hours As Needed for Nausea or Vomiting.  Dispense: 30 tablet; Refill: 0  -     Tirzepatide-Weight Management (ZEPBOUND) 2.5 MG/0.5ML solution auto-injector; Inject 0.5 mL under the skin into the appropriate area as directed 1 (One) Time Per Week.  Dispense: 2 mL; Refill: 0    2. Morbid obesity with BMI of 45.0-49.9, adult  -     ondansetron (Zofran) 4 MG tablet; Take 1 tablet by mouth Every 8 (Eight) Hours As Needed for Nausea or Vomiting.  Dispense: 30 tablet; Refill: 0  -     Tirzepatide-Weight Management (ZEPBOUND) 2.5 MG/0.5ML solution auto-injector; Inject 0.5 mL under the skin into the appropriate area as directed 1 (One) Time Per Week.  Dispense: 2 mL; Refill: 0             Follow Up   Return in about 3 months (around 5/7/2025) for Next scheduled follow up.  Patient was given instructions and counseling regarding her condition or for health maintenance advice. Please see specific information pulled into the AVS if appropriate.

## 2025-03-10 DIAGNOSIS — R63.5 WEIGHT GAIN: ICD-10-CM

## 2025-03-10 DIAGNOSIS — E78.00 ELEVATED LDL CHOLESTEROL LEVEL: ICD-10-CM

## 2025-03-10 DIAGNOSIS — E66.01 MORBID OBESITY WITH BMI OF 45.0-49.9, ADULT: Primary | ICD-10-CM

## 2025-03-10 DIAGNOSIS — Z76.89 ENCOUNTER FOR WEIGHT MANAGEMENT: ICD-10-CM

## 2025-03-25 DIAGNOSIS — H10.13 ALLERGIC CONJUNCTIVITIS OF BOTH EYES: ICD-10-CM

## 2025-03-25 DIAGNOSIS — J30.2 SEASONAL ALLERGIES: ICD-10-CM

## 2025-03-25 RX ORDER — MONTELUKAST SODIUM 10 MG/1
10 TABLET ORAL NIGHTLY
Qty: 90 TABLET | Refills: 0 | Status: SHIPPED | OUTPATIENT
Start: 2025-03-25

## 2025-03-25 RX ORDER — FLUTICASONE PROPIONATE AND SALMETEROL 50; 250 UG/1; UG/1
1 POWDER RESPIRATORY (INHALATION)
Qty: 60 EACH | Refills: 3 | Status: SHIPPED | OUTPATIENT
Start: 2025-03-25

## 2025-04-09 ENCOUNTER — TELEPHONE (OUTPATIENT)
Dept: FAMILY MEDICINE CLINIC | Facility: CLINIC | Age: 25
End: 2025-04-09
Payer: COMMERCIAL

## 2025-04-09 NOTE — TELEPHONE ENCOUNTER
Caller: Jenn Zhong'A    Relationship to patient: Self    Best call back number: [phone]     Patient is needing: SENT A MY CHART MESSAGE YESTERDAY BUT HAVING ISSUES AT PHARMACY ABOUT THE ZEPBOUND MEDICATION THEY ARE TRYING TO GIVE HER MOUNJARO AND TOLD HER ITS THE GENERIC AND SHE HAS CONCERNS ABOUT THAT PLEASE CALL AND ADVISE

## 2025-05-09 ENCOUNTER — OFFICE VISIT (OUTPATIENT)
Dept: FAMILY MEDICINE CLINIC | Facility: CLINIC | Age: 25
End: 2025-05-09
Payer: COMMERCIAL

## 2025-05-09 VITALS
HEIGHT: 67 IN | DIASTOLIC BLOOD PRESSURE: 66 MMHG | BODY MASS INDEX: 45.99 KG/M2 | TEMPERATURE: 98.4 F | WEIGHT: 293 LBS | SYSTOLIC BLOOD PRESSURE: 106 MMHG

## 2025-05-09 DIAGNOSIS — Z76.89 ENCOUNTER FOR WEIGHT MANAGEMENT: Primary | ICD-10-CM

## 2025-05-09 DIAGNOSIS — E66.01 MORBID OBESITY WITH BMI OF 45.0-49.9, ADULT: Chronic | ICD-10-CM

## 2025-05-09 NOTE — PROGRESS NOTES
"Chief Complaint  Weight Management (Follow up/)    Subjective        Jenn Zhong presents to [facility]    History of Present Illness  24 year old female presents for weight management. She has lost 5 lbs since previous visit. Blood pressure has improved at 106/66. Currently taking Zepbound 7.mg weekly.  She has noticed decrease in appetite and is working with a  on exercise and muscle building. Denies abdominal pain, nausea, vomiting, diarrhea, and constipation. She is agreeable to increasing dose to 10mg weekly. Return in about 3 months for annual physical.   Weight Management  Weight:  Decreased  Weight change (lbs):  7  Weight loss treatment:  Commercial weight loss program  Treatment barriers:  Ran out of medications  Physical activity tolerance:  Improved  Energy level:  Increased      Objective   Vital Signs:  /66 (BP Location: Left arm, Patient Position: Sitting, Cuff Size: Large Adult)   Temp 98.4 °F (36.9 °C)   Ht 170.2 cm (67.01\")   Wt (!) 137 kg (302 lb 9.6 oz)   BMI 47.38 kg/m²   Estimated body mass index is 47.38 kg/m² as calculated from the following:    Height as of this encounter: 170.2 cm (67.01\").    Weight as of this encounter: 137 kg (302 lb 9.6 oz).        Physical Exam  Constitutional:       Appearance: Normal appearance.   HENT:      Head: Normocephalic.   Eyes:      Conjunctiva/sclera: Conjunctivae normal.      Pupils: Pupils are equal, round, and reactive to light.   Cardiovascular:      Rate and Rhythm: Normal rate and regular rhythm.      Pulses: Normal pulses.      Heart sounds: Normal heart sounds.   Pulmonary:      Effort: Pulmonary effort is normal.      Breath sounds: Normal breath sounds.   Skin:     General: Skin is warm.   Neurological:      General: No focal deficit present.      Mental Status: She is alert and oriented to person, place, and time.   Psychiatric:         Mood and Affect: Mood normal.         Behavior: " Behavior normal.        Result Review :  The following data was reviewed by: CINDY Miles on 05/09/2025:  Common labs          7/30/2024    08:57   Common Labs   Glucose 83    BUN 11    Creatinine 0.73    Sodium 142    Potassium 4.3    Chloride 102    Calcium 9.5    Albumin 4.2    Total Bilirubin 0.3    Alkaline Phosphatase 74    AST (SGOT) 14    ALT (SGPT) 12    WBC 9.0    Hemoglobin 12.6    Hematocrit 39.8    Platelets 354    Total Cholesterol 192    Triglycerides 70    HDL Cholesterol 67    LDL Cholesterol  112    Hemoglobin A1C 5.4      Data reviewed : labs            Assessment and Plan   Diagnoses and all orders for this visit:    1. Encounter for weight management (Primary)  -     Tirzepatide-Weight Management (ZEPBOUND) 10 MG/0.5ML solution auto-injector; Inject 0.5 mL under the skin into the appropriate area as directed 1 (One) Time Per Week.  Dispense: 2 mL; Refill: 2    2. Morbid obesity with BMI of 45.0-49.9, adult  -     Tirzepatide-Weight Management (ZEPBOUND) 10 MG/0.5ML solution auto-injector; Inject 0.5 mL under the skin into the appropriate area as directed 1 (One) Time Per Week.  Dispense: 2 mL; Refill: 2             Follow Up   Return in about 3 months (around 7/31/2025) for Annual physical.  Patient was given instructions and counseling regarding her condition or for health maintenance advice. Please see specific information pulled into the AVS if appropriate.

## 2025-05-19 RX ORDER — FLUCONAZOLE 200 MG/1
200 TABLET ORAL ONCE AS NEEDED
Qty: 1 TABLET | Refills: 0 | Status: SHIPPED | OUTPATIENT
Start: 2025-05-19

## 2025-06-08 DIAGNOSIS — E66.01 MORBID OBESITY WITH BMI OF 45.0-49.9, ADULT: Primary | ICD-10-CM

## 2025-06-08 DIAGNOSIS — Z79.899 MEDICATION MANAGEMENT: ICD-10-CM

## 2025-06-08 DIAGNOSIS — Z76.89 ENCOUNTER FOR WEIGHT MANAGEMENT: ICD-10-CM

## 2025-06-09 DIAGNOSIS — Z76.89 ENCOUNTER FOR WEIGHT MANAGEMENT: ICD-10-CM

## 2025-06-09 DIAGNOSIS — E66.01 MORBID OBESITY WITH BMI OF 45.0-49.9, ADULT: Chronic | ICD-10-CM

## 2025-06-12 RX ORDER — FLUCONAZOLE 200 MG/1
200 TABLET ORAL ONCE AS NEEDED
Qty: 1 TABLET | Refills: 0 | Status: SHIPPED | OUTPATIENT
Start: 2025-06-12

## 2025-06-26 DIAGNOSIS — F41.9 ANXIETY: Chronic | ICD-10-CM

## 2025-06-26 RX ORDER — BUPROPION HYDROCHLORIDE 150 MG/1
150 TABLET ORAL DAILY
Qty: 90 TABLET | Refills: 3 | Status: SHIPPED | OUTPATIENT
Start: 2025-06-26

## 2025-06-26 NOTE — TELEPHONE ENCOUNTER
Caller: Jenn Zhong    Relationship: Self    Best call back number: [phone]     Requested Prescriptions:   Requested Prescriptions     Pending Prescriptions Disp Refills    buPROPion XL (WELLBUTRIN XL) 150 MG 24 hr tablet 90 tablet 3     Sig: Take 1 tablet by mouth Daily.        Pharmacy where request should be sent: [address] ([facility] [place] - [address] [facility] [phone] [facility] [phone]      Last office visit with prescribing clinician: 5/9/2025   Last telemedicine visit with prescribing clinician: Visit date not found   Next office visit with prescribing clinician: 7/28/2025     Additional details provided by patient: OUT OF MEDICATION     Does the patient have less than a 3 day supply:  [x] Yes  [] No    Would you like a call back once the refill request has been completed: [] Yes [x] No    If the office needs to give you a call back, can they leave a voicemail: [] Yes [x] No    Frederick Ivey   06/26/25 10:22 EDT

## 2025-06-29 DIAGNOSIS — E66.01 MORBID OBESITY WITH BMI OF 45.0-49.9, ADULT: Chronic | ICD-10-CM

## 2025-06-29 DIAGNOSIS — Z76.89 ENCOUNTER FOR WEIGHT MANAGEMENT: ICD-10-CM

## 2025-07-26 DIAGNOSIS — J30.2 SEASONAL ALLERGIES: ICD-10-CM

## 2025-07-26 DIAGNOSIS — H10.13 ALLERGIC CONJUNCTIVITIS OF BOTH EYES: ICD-10-CM

## 2025-07-28 ENCOUNTER — OFFICE VISIT (OUTPATIENT)
Dept: FAMILY MEDICINE CLINIC | Facility: CLINIC | Age: 25
End: 2025-07-28
Payer: COMMERCIAL

## 2025-07-28 VITALS
HEIGHT: 67 IN | WEIGHT: 291.6 LBS | DIASTOLIC BLOOD PRESSURE: 76 MMHG | BODY MASS INDEX: 45.77 KG/M2 | SYSTOLIC BLOOD PRESSURE: 118 MMHG | TEMPERATURE: 97.5 F

## 2025-07-28 DIAGNOSIS — E78.00 ELEVATED LOW DENSITY LIPOPROTEIN (LDL) CHOLESTEROL LEVEL: ICD-10-CM

## 2025-07-28 DIAGNOSIS — N76.0 RECURRENT VAGINITIS: ICD-10-CM

## 2025-07-28 DIAGNOSIS — Z00.00 ENCOUNTER FOR ANNUAL PHYSICAL EXAM: Primary | ICD-10-CM

## 2025-07-28 DIAGNOSIS — E55.9 VITAMIN D DEFICIENCY: ICD-10-CM

## 2025-07-28 PROCEDURE — 99214 OFFICE O/P EST MOD 30 MIN: CPT

## 2025-07-28 PROCEDURE — 99395 PREV VISIT EST AGE 18-39: CPT

## 2025-07-28 RX ORDER — MONTELUKAST SODIUM 10 MG/1
10 TABLET ORAL NIGHTLY
Qty: 90 TABLET | Refills: 0 | Status: SHIPPED | OUTPATIENT
Start: 2025-07-28

## 2025-07-28 NOTE — PROGRESS NOTES
Chief Complaint  Annual Exam (Annual physical)    Subjective            Jenn Zhong presents to [facility]    History of Present Illness       The patient presents for an annual physical exam.    She reports no need for medication refills at this time. She is considering the use of Wegovy for weight management, as her insurance no longer covers Zepbound. She has been proactive in managing her weight, including consistent sessions with a . She notes an increase in appetite since discontinuing her last dose of Zepbound about a month ago. Her goal is to reach a weight of 250 pounds by the end of the year.    She has not undergone a mammogram or colonoscopy. She had a dental check-up within the past six months but has not seen an optometrist. She reports no chest pain or shortness of breath. She also reports no changes in bowel or bladder habits. Over the weekend, she experienced fluid accumulation in her leg, which was managed with a diuretic. She has not had any recent yeast or bacterial vaginosis (BV) infections, with the last episode occurring approximately three months ago. She is interested in testing for Ureaplasma or Mycoplasma, as she has read that these bacteria can be misdiagnosed as BV or yeast infections.    Social History:  Occupations: Works in healthcare       CPE- Patient reporting that health is doing well overall.  Patient is here today for annual physical.  Eating a balanced diet.    Labs: Due for routine labs    Colorectal cancer screening: N/A  Breast cancer screening: N/A  Cervical cancer screening: Ob/GYN appt scheduled 8/26/25      Immunization History   Administered Date(s) Administered    COVID-19 (MODERNA) 12YRS+ (SPIKEVAX) 09/10/2024    COVID-19 (PFIZER) Purple Cap Monovalent 02/07/2022    DTaP 2000, 01/15/2001, 03/15/2001, 10/11/2001, 09/24/2004    DTaP, Unspecified 2000, 01/15/2001, 03/15/2001, 10/11/2001, 09/24/2004  "   Flu Vaccine Quad PF >36MO 10/06/2017, 10/07/2018, 10/20/2019    Flublock Quad =>18yrs 11/01/2020    Fluzone (or Fluarix & Flulaval for VFC) >6mos 08/25/2023    Fluzone Quad >6mos (Multi-dose) 12/05/2016, 11/12/2020    HPV Quadrivalent 10/14/2011, 01/05/2012, 05/24/2012    HPV, Unspecified 10/14/2011, 01/05/2012, 05/24/2012    Hep A, 2 Dose 10/14/2011, 10/06/2017    Hep B, Adolescent or Pediatric 2000, 2000, 03/15/2001, 12/10/2001    Hepatitis A 10/14/2011, 10/06/2017    Hepatitis B Adult/Adolescent IM 2000, 2000, 03/15/2001, 12/10/2001    HiB 2000, 01/15/2001, 03/15/2001, 12/10/2001    IPV 2000, 01/15/2001, 12/10/2001, 09/24/2004    Influenza TIV (IM) 12/05/2016    Influenza, Unspecified 11/30/2012, 11/03/2013, 11/26/2014, 10/26/2015, 11/23/2015    MMR 10/11/2001, 09/24/2004    Meningococcal Conjugate 10/14/2011    Meningococcal MCV4P (Menactra) 10/14/2011, 12/05/2016    PPD Test 05/08/2017    Td (TDVAX) 05/11/2016    Td, Not Adsorbed 05/11/2016    Tdap 10/14/2011    Varicella 10/11/2001, 10/26/2011      Patient has seen dentist within last 6 months  yes  Patient has seen eye specialist within last 6 months no    PHQ-2 Depression Screening  Little interest or pleasure in doing things?     Feeling down, depressed, or hopeless?     PHQ-2 Total Score        Social History     Socioeconomic History    Marital status: Single   Tobacco Use    Smoking status: Never     Passive exposure: Never    Smokeless tobacco: Never   Vaping Use    Vaping status: Never Used   Substance and Sexual Activity    Alcohol use: No    Drug use: No    Sexual activity: Not Currently     Partners: Male     Birth control/protection: Condom, Patch          Objective   Vital Signs:   /76 (BP Location: Left arm, Patient Position: Sitting, Cuff Size: Large Adult)   Temp 97.5 °F (36.4 °C) (Temporal)   Ht 170.2 cm (67.01\")   Wt 132 kg (291 lb 9.6 oz)   BMI 45.66 kg/m²         Physical " Exam  Constitutional:       Appearance: Normal appearance.   HENT:      Head: Normocephalic.      Right Ear: Tympanic membrane, ear canal and external ear normal.      Left Ear: Tympanic membrane, ear canal and external ear normal.      Nose: Nose normal. No congestion.      Mouth/Throat:      Mouth: Mucous membranes are moist.      Pharynx: Oropharynx is clear.   Eyes:      Conjunctiva/sclera: Conjunctivae normal.      Pupils: Pupils are equal, round, and reactive to light.   Cardiovascular:      Rate and Rhythm: Normal rate and regular rhythm.      Pulses: Normal pulses.      Heart sounds: Normal heart sounds.   Pulmonary:      Effort: Pulmonary effort is normal.      Breath sounds: Normal breath sounds. No wheezing.   Abdominal:      General: Abdomen is flat.   Musculoskeletal:         General: Normal range of motion.      Cervical back: Normal range of motion.   Skin:     General: Skin is warm.   Neurological:      General: No focal deficit present.      Mental Status: She is alert and oriented to person, place, and time.   Psychiatric:         Mood and Affect: Mood normal.         Behavior: Behavior normal.        Result Review :   The following data was reviewed by: CINDY Miles on 07/28/2025:    Data reviewed : labs            Current Outpatient Medications:     Advair Diskus 250-50 MCG/ACT DISKUS, Inhale 1 puff 2 (Two) Times a Day., Disp: 60 each, Rfl: 3    albuterol sulfate  (90 Base) MCG/ACT inhaler, Inhale 2 puffs Every 6 (Six) Hours As Needed for Wheezing. 2 puffs every 4-6 hours PRN cough/SOA, Disp: 18 g, Rfl: 3    buPROPion XL (WELLBUTRIN XL) 150 MG 24 hr tablet, Take 1 tablet by mouth Daily., Disp: 90 tablet, Rfl: 3    fluticasone (FLONASE) 50 MCG/ACT nasal spray, Administer 2 sprays into the nostril(s) as directed by provider Daily. (Patient taking differently: Administer 2 sprays into the nostril(s) as directed by provider As Needed for Rhinitis or Allergies.), Disp: 15.8 mL, Rfl: 0     furosemide (Lasix) 40 MG tablet, Take 1 tablet by mouth Daily., Disp: 30 tablet, Rfl: 2    montelukast (SINGULAIR) 10 MG tablet, TAKE 1 TABLET BY MOUTH ONCE DAILY AT NIGHT, Disp: 90 tablet, Rfl: 0    norelgestromin-ethinyl estradiol (Xulane) 150-35 MCG/24HR, Place 1 patch on the skin as directed by provider 1 (One) Time Per Week., Disp: , Rfl:     potassium chloride (K-DUR,KLOR-CON) 20 MEQ CR tablet, Take 1 tablet by mouth Daily., Disp: 30 tablet, Rfl: 2    vitamin D (ERGOCALCIFEROL) 1.25 MG (24802 UT) capsule capsule, Take 1 capsule by mouth 1 (One) Time Per Week., Disp: 12 capsule, Rfl: 0    fluconazole (DIFLUCAN) 200 MG tablet, Take 1 tablet by mouth 1 (One) Time As Needed (vaginitis) for up to 1 dose. (Patient not taking: Reported on 7/28/2025), Disp: 1 tablet, Rfl: 0    ondansetron (Zofran) 4 MG tablet, Take 1 tablet by mouth Every 8 (Eight) Hours As Needed for Nausea or Vomiting. (Patient not taking: Reported on 7/28/2025), Disp: 30 tablet, Rfl: 0    Semaglutide-Weight Management 1 MG/0.5ML solution auto-injector, Inject 0.5 mL under the skin into the appropriate area as directed 1 (One) Time Per Week., Disp: 2 mL, Rfl: 0    Tirzepatide-Weight Management (ZEPBOUND) 10 MG/0.5ML solution auto-injector, Inject 0.5 mL under the skin into the appropriate area as directed 1 (One) Time Per Week. (Patient not taking: Reported on 7/28/2025), Disp: 2 mL, Rfl: 2          Assessment and Plan         1. Annual physical exam.  - Weight is within a healthy range, and blood pressure readings are satisfactory at 118/76.  - Physical exam findings include no pain upon pressing along the spine or abdomen, and no swollen lymph nodes.  - Laboratory tests will be ordered to monitor vitamin D levels due to a known deficiency and LDL cholesterol levels, which were slightly elevated a year ago. A urine test will be conducted to check for Ureaplasma or Mycoplasma.  - Prescription for Wegovy will be provided, starting at a mid-dose  level similar to her previous Zepbound regimen. Potential side effects of nausea, vomiting, diarrhea, and constipation were discussed. An annual eye exam was recommended.    Follow-up: A follow-up visit is scheduled in 6 months, or sooner if necessary.    Diagnoses and all orders for this visit:    1. Encounter for annual physical exam (Primary)  -     UA / M With / Rflx Culture([facility] ONLY) - Urine, Clean Catch; Future  -     CBC w AUTO Differential; Future  -     Comprehensive metabolic panel; Future    2. Vitamin D deficiency  -     Vitamin D 25 hydroxy; Future    3. Elevated low density lipoprotein (LDL) cholesterol level  -     CBC w AUTO Differential; Future  -     Comprehensive metabolic panel; Future  -     Lipid panel; Future  -     Semaglutide-Weight Management 1 MG/0.5ML solution auto-injector; Inject 0.5 mL under the skin into the appropriate area as directed 1 (One) Time Per Week.  Dispense: 2 mL; Refill: 0    4. Body mass index (BMI) of 45.0 to 49.9 in adult  -     Semaglutide-Weight Management 1 MG/0.5ML solution auto-injector; Inject 0.5 mL under the skin into the appropriate area as directed 1 (One) Time Per Week.  Dispense: 2 mL; Refill: 0    5. Recurrent vaginitis  -     Mycoplasma / Ureaplasma Culture - Urine, Urine, Clean Catch; Future              The patient was counseled regarding nutrition, physical activity, healthy weight, injury prevention, misuse of tobacco, alcohol and illicit drugs, mental health, immunizations, and screenings.      Follow Up   Return in about 6 months (around 1/28/2026) for Next scheduled follow up.  Patient was given instructions and counseling regarding her condition or for health maintenance advice. Please see specific information pulled into the AVS if appropriate.     Patient or patient representative verbalized consent for the use of Ambient Listening during the visit with  CINDY Miles for chart documentation. 7/28/2025  15:28 EDT

## 2025-07-29 DIAGNOSIS — Z00.00 ENCOUNTER FOR ANNUAL PHYSICAL EXAM: ICD-10-CM

## 2025-07-29 DIAGNOSIS — E78.00 ELEVATED LOW DENSITY LIPOPROTEIN (LDL) CHOLESTEROL LEVEL: ICD-10-CM

## 2025-07-29 DIAGNOSIS — E55.9 VITAMIN D DEFICIENCY: ICD-10-CM

## 2025-07-30 LAB
25(OH)D3+25(OH)D2 SERPL-MCNC: 43.4 NG/ML (ref 30–100)
ALBUMIN SERPL-MCNC: 4.4 G/DL (ref 4–5)
ALP SERPL-CCNC: 77 IU/L (ref 44–121)
ALT SERPL-CCNC: 13 IU/L (ref 0–32)
AST SERPL-CCNC: 15 IU/L (ref 0–40)
BASOPHILS # BLD AUTO: 0 X10E3/UL (ref 0–0.2)
BASOPHILS NFR BLD AUTO: 0 %
BILIRUB SERPL-MCNC: 0.3 MG/DL (ref 0–1.2)
BUN SERPL-MCNC: 12 MG/DL (ref 6–20)
BUN/CREAT SERPL: 14 (ref 9–23)
CALCIUM SERPL-MCNC: 9.5 MG/DL (ref 8.7–10.2)
CHLORIDE SERPL-SCNC: 100 MMOL/L (ref 96–106)
CHOLEST SERPL-MCNC: 214 MG/DL (ref 100–199)
CO2 SERPL-SCNC: 20 MMOL/L (ref 20–29)
CREAT SERPL-MCNC: 0.83 MG/DL (ref 0.57–1)
EGFRCR SERPLBLD CKD-EPI 2021: 101 ML/MIN/1.73
EOSINOPHIL # BLD AUTO: 0.2 X10E3/UL (ref 0–0.4)
EOSINOPHIL NFR BLD AUTO: 2 %
ERYTHROCYTE [DISTWIDTH] IN BLOOD BY AUTOMATED COUNT: 12.2 % (ref 11.7–15.4)
GLOBULIN SER CALC-MCNC: 3 G/DL (ref 1.5–4.5)
GLUCOSE SERPL-MCNC: NORMAL MG/DL
HCT VFR BLD AUTO: 41.2 % (ref 34–46.6)
HDLC SERPL-MCNC: 68 MG/DL
HGB BLD-MCNC: 12.9 G/DL (ref 11.1–15.9)
IMM GRANULOCYTES # BLD AUTO: 0 X10E3/UL (ref 0–0.1)
IMM GRANULOCYTES NFR BLD AUTO: 0 %
LDLC SERPL CALC-MCNC: 125 MG/DL (ref 0–99)
LYMPHOCYTES # BLD AUTO: 2.3 X10E3/UL (ref 0.7–3.1)
LYMPHOCYTES NFR BLD AUTO: 23 %
MCH RBC QN AUTO: 27.6 PG (ref 26.6–33)
MCHC RBC AUTO-ENTMCNC: 31.3 G/DL (ref 31.5–35.7)
MCV RBC AUTO: 88 FL (ref 79–97)
MONOCYTES # BLD AUTO: 0.5 X10E3/UL (ref 0.1–0.9)
MONOCYTES NFR BLD AUTO: 5 %
NEUTROPHILS # BLD AUTO: 6.8 X10E3/UL (ref 1.4–7)
NEUTROPHILS NFR BLD AUTO: 70 %
PLATELET # BLD AUTO: 380 X10E3/UL (ref 150–450)
POTASSIUM SERPL-SCNC: NORMAL MMOL/L
PROT SERPL-MCNC: 7.4 G/DL (ref 6–8.5)
RBC # BLD AUTO: 4.68 X10E6/UL (ref 3.77–5.28)
SODIUM SERPL-SCNC: 139 MMOL/L (ref 134–144)
TRIGL SERPL-MCNC: 121 MG/DL (ref 0–149)
VLDLC SERPL CALC-MCNC: 21 MG/DL (ref 5–40)
WBC # BLD AUTO: 9.8 X10E3/UL (ref 3.4–10.8)

## 2025-07-31 ENCOUNTER — RESULTS FOLLOW-UP (OUTPATIENT)
Dept: FAMILY MEDICINE CLINIC | Facility: CLINIC | Age: 25
End: 2025-07-31
Payer: COMMERCIAL

## 2025-07-31 DIAGNOSIS — N76.0 RECURRENT VAGINITIS: Primary | ICD-10-CM

## 2025-07-31 DIAGNOSIS — N76.0 RECURRENT VAGINITIS: ICD-10-CM

## 2025-08-02 LAB
ALBUMIN SERPL-MCNC: 4.3 G/DL (ref 4–5)
ALP SERPL-CCNC: 77 IU/L (ref 44–121)
ALT SERPL-CCNC: 16 IU/L (ref 0–32)
AST SERPL-CCNC: 16 IU/L (ref 0–40)
BILIRUB SERPL-MCNC: <0.2 MG/DL (ref 0–1.2)
BUN SERPL-MCNC: 14 MG/DL (ref 6–20)
BUN/CREAT SERPL: 18 (ref 9–23)
CALCIUM SERPL-MCNC: 9.6 MG/DL (ref 8.7–10.2)
CHLORIDE SERPL-SCNC: 103 MMOL/L (ref 96–106)
CO2 SERPL-SCNC: 22 MMOL/L (ref 20–29)
CREAT SERPL-MCNC: 0.79 MG/DL (ref 0.57–1)
EGFRCR SERPLBLD CKD-EPI 2021: 107 ML/MIN/1.73
GLOBULIN SER CALC-MCNC: 2.9 G/DL (ref 1.5–4.5)
GLUCOSE SERPL-MCNC: 86 MG/DL (ref 70–99)
POTASSIUM SERPL-SCNC: 4 MMOL/L (ref 3.5–5.2)
PROT SERPL-MCNC: 7.2 G/DL (ref 6–8.5)
SODIUM SERPL-SCNC: 140 MMOL/L (ref 134–144)

## 2025-08-04 ENCOUNTER — RESULTS FOLLOW-UP (OUTPATIENT)
Dept: FAMILY MEDICINE CLINIC | Facility: CLINIC | Age: 25
End: 2025-08-04
Payer: COMMERCIAL

## 2025-08-04 DIAGNOSIS — N76.0 RECURRENT VAGINITIS: Primary | ICD-10-CM

## 2025-08-07 LAB
M HOMINIS SPEC QL CULT: NEGATIVE
U UREALYTICUM SPEC QL CULT: POSITIVE

## 2025-08-11 RX ORDER — DOXYCYCLINE 100 MG/1
100 CAPSULE ORAL 2 TIMES DAILY
Qty: 14 CAPSULE | Refills: 0 | Status: SHIPPED | OUTPATIENT
Start: 2025-08-11 | End: 2025-08-18

## 2025-08-13 RX ORDER — FLUCONAZOLE 150 MG/1
TABLET ORAL
Qty: 2 TABLET | Refills: 0 | Status: SHIPPED | OUTPATIENT
Start: 2025-08-13